# Patient Record
Sex: MALE | Race: BLACK OR AFRICAN AMERICAN | Employment: FULL TIME | URBAN - METROPOLITAN AREA
[De-identification: names, ages, dates, MRNs, and addresses within clinical notes are randomized per-mention and may not be internally consistent; named-entity substitution may affect disease eponyms.]

---

## 2020-01-15 ENCOUNTER — OP HISTORICAL/CONVERTED ENCOUNTER (OUTPATIENT)
Dept: OTHER | Age: 50
End: 2020-01-15

## 2020-02-01 ENCOUNTER — OP HISTORICAL/CONVERTED ENCOUNTER (OUTPATIENT)
Dept: OTHER | Age: 50
End: 2020-02-01

## 2020-03-01 ENCOUNTER — OP HISTORICAL/CONVERTED ENCOUNTER (OUTPATIENT)
Dept: OTHER | Age: 50
End: 2020-03-01

## 2020-04-01 ENCOUNTER — OP HISTORICAL/CONVERTED ENCOUNTER (OUTPATIENT)
Dept: OTHER | Age: 50
End: 2020-04-01

## 2020-05-01 ENCOUNTER — OP HISTORICAL/CONVERTED ENCOUNTER (OUTPATIENT)
Dept: OTHER | Age: 50
End: 2020-05-01

## 2020-06-01 ENCOUNTER — OP HISTORICAL/CONVERTED ENCOUNTER (OUTPATIENT)
Dept: OTHER | Age: 50
End: 2020-06-01

## 2020-07-01 ENCOUNTER — OP HISTORICAL/CONVERTED ENCOUNTER (OUTPATIENT)
Dept: OTHER | Age: 50
End: 2020-07-01

## 2020-07-30 VITALS
BODY MASS INDEX: 29.77 KG/M2 | WEIGHT: 178.7 LBS | DIASTOLIC BLOOD PRESSURE: 92 MMHG | HEIGHT: 65 IN | SYSTOLIC BLOOD PRESSURE: 132 MMHG

## 2020-07-30 PROBLEM — D64.9 ANEMIA: Status: ACTIVE | Noted: 2020-07-30

## 2020-07-30 PROBLEM — D72.819 LEUKOPENIA: Status: ACTIVE | Noted: 2020-07-30

## 2020-07-30 PROBLEM — E05.90 HYPERTHYROIDISM: Status: ACTIVE | Noted: 2020-07-30

## 2020-07-30 RX ORDER — BISMUTH SUBSALICYLATE 262 MG
1 TABLET,CHEWABLE ORAL DAILY
COMMUNITY

## 2020-07-30 RX ORDER — LANOLIN ALCOHOL/MO/W.PET/CERES
CREAM (GRAM) TOPICAL
COMMUNITY
End: 2020-12-11 | Stop reason: ALTCHOICE

## 2020-07-30 RX ORDER — METHIMAZOLE 5 MG/1
TABLET ORAL
COMMUNITY
End: 2021-03-03

## 2020-08-01 ENCOUNTER — OP HISTORICAL/CONVERTED ENCOUNTER (OUTPATIENT)
Dept: OTHER | Age: 50
End: 2020-08-01

## 2020-08-12 VITALS
TEMPERATURE: 98.1 F | OXYGEN SATURATION: 98 % | HEART RATE: 63 BPM | SYSTOLIC BLOOD PRESSURE: 132 MMHG | HEIGHT: 65 IN | BODY MASS INDEX: 29.77 KG/M2 | WEIGHT: 178.7 LBS | DIASTOLIC BLOOD PRESSURE: 92 MMHG

## 2020-08-12 RX ORDER — ZOSTER VACCINE RECOMBINANT, ADJUVANTED 50 MCG/0.5
0.5 KIT INTRAMUSCULAR ONCE
COMMUNITY
End: 2020-12-11 | Stop reason: ALTCHOICE

## 2020-09-01 ENCOUNTER — OP HISTORICAL/CONVERTED ENCOUNTER (OUTPATIENT)
Dept: OTHER | Age: 50
End: 2020-09-01

## 2020-10-01 ENCOUNTER — OP HISTORICAL/CONVERTED ENCOUNTER (OUTPATIENT)
Dept: OTHER | Age: 50
End: 2020-10-01

## 2020-12-04 LAB
BASOPHILS # BLD AUTO: 0 X10E3/UL (ref 0–0.2)
BASOPHILS NFR BLD AUTO: 1 %
EOSINOPHIL # BLD AUTO: 0.1 X10E3/UL (ref 0–0.4)
EOSINOPHIL NFR BLD AUTO: 4 %
ERYTHROCYTE [DISTWIDTH] IN BLOOD BY AUTOMATED COUNT: 13.3 % (ref 11.6–15.4)
HCT VFR BLD AUTO: 40.8 % (ref 37.5–51)
HGB BLD-MCNC: 13.8 G/DL (ref 13–17.7)
IMM GRANULOCYTES # BLD AUTO: 0 X10E3/UL (ref 0–0.1)
IMM GRANULOCYTES NFR BLD AUTO: 0 %
LYMPHOCYTES # BLD AUTO: 1.1 X10E3/UL (ref 0.7–3.1)
LYMPHOCYTES NFR BLD AUTO: 35 %
MCH RBC QN AUTO: 28.9 PG (ref 26.6–33)
MCHC RBC AUTO-ENTMCNC: 33.8 G/DL (ref 31.5–35.7)
MCV RBC AUTO: 85 FL (ref 79–97)
MONOCYTES # BLD AUTO: 0.3 X10E3/UL (ref 0.1–0.9)
MONOCYTES NFR BLD AUTO: 10 %
NEUTROPHILS # BLD AUTO: 1.5 X10E3/UL (ref 1.4–7)
NEUTROPHILS NFR BLD AUTO: 50 %
PLATELET # BLD AUTO: 178 X10E3/UL (ref 150–450)
RBC # BLD AUTO: 4.78 X10E6/UL (ref 4.14–5.8)
T4 FREE SERPL-MCNC: 1.23 NG/DL (ref 0.82–1.77)
TSH SERPL DL<=0.005 MIU/L-ACNC: 1.02 UIU/ML (ref 0.45–4.5)
WBC # BLD AUTO: 3 X10E3/UL (ref 3.4–10.8)

## 2020-12-11 ENCOUNTER — OFFICE VISIT (OUTPATIENT)
Dept: ENDOCRINOLOGY | Age: 50
End: 2020-12-11
Payer: COMMERCIAL

## 2020-12-11 VITALS
BODY MASS INDEX: 29.16 KG/M2 | OXYGEN SATURATION: 97 % | WEIGHT: 175 LBS | SYSTOLIC BLOOD PRESSURE: 136 MMHG | DIASTOLIC BLOOD PRESSURE: 91 MMHG | TEMPERATURE: 96.8 F | HEART RATE: 82 BPM | HEIGHT: 65 IN

## 2020-12-11 DIAGNOSIS — E05.90 HYPERTHYROIDISM: Primary | ICD-10-CM

## 2020-12-11 DIAGNOSIS — D72.819 LEUKOPENIA, UNSPECIFIED TYPE: ICD-10-CM

## 2020-12-11 PROCEDURE — 99213 OFFICE O/P EST LOW 20 MIN: CPT | Performed by: INTERNAL MEDICINE

## 2020-12-11 NOTE — PROGRESS NOTES
History and Physical    Patient: Trish Chow MRN: [id number]  SSN: [id number]    YOB: 1970  Age: 44 y.o. Sex: male      Subjective:      Trish Chow is a 44 y.o. male with no significant past medical history is here for follow-up of hyperthyroidism. He was initially sent to me by hematologist/oncologist Dr. Ricardo Felton. patient does not have a primary care physician. after the initial visit patient had complete thyroid function panel done which continued to show subclinical hyperthyroidism, antibodies came back negative. Following this patient had thyroid uptake scan done which showed increased uptake with uniform distribution. Patient was symptomatic with intermittent palpitations and increased frequency of bowel movements. He was started on methimazole 5 mg daily in 1/2020. After that he reported decrease in frequency of bowel movement and palpitations. So he continues to be on methimazole 5 mg daily. He had labs done prior to this visit. He has lost 3 pounds since last visit. Feels well, sleep is good, no palpitations, tremors, frequent bowel movements. Regarding leukopenia, he is following with hematologist every 3 months. Last appointment was in November 2020. Initial history:  Patient was referred to hematologist for leukopenia. During testing for this it was found that his TSH was very low, so patient is sent here for further evaluation and management. Symptoms: no heat/cold intolerance, weight fluctuates but it has been stable, no palpitations, tremors, sleep is okay, energy level is okay, mood is okay, no hair or skin changes, sometimes constipation and sometimes frequent bowel movements.     Prior history of thyroid problems: no    Recent steroid treatments: no    High dose Biotin supplemnts: Nugenix Multivitamin has 30 µg biotin,Extenze male enhancement, patient takes this on and off, this has micronized DHEA and pregnanolone)    Recent URI: no    Tenderness in neck: no    Swelling in neck: no    Family history of thyroid problems: no    Personal/family history of autoimmune diseases: no    smoking: no    Change in appearance of eyes/ redness/ eye irritation: no    Personal history of cardiac disease: no    Personal history of osteoporosis/fragility fractures: no    Past Medical History:   Diagnosis Date    Anemia 7/30/2020    Anemia     Blood disorder     Hyperthyroidism 7/30/2020    Leukopenia 7/30/2020    Thyroid disease      History reviewed. No pertinent surgical history. Family History   Problem Relation Age of Onset    Hypertension Mother     Cancer Father      Social History     Tobacco Use    Smoking status: Never Smoker    Smokeless tobacco: Never Used   Substance Use Topics    Alcohol use: Never     Frequency: Never      Prior to Admission medications    Medication Sig Start Date End Date Taking? Authorizing Provider   methIMAzole (TAPAZOLE) 5 mg tablet Take  by mouth. Yes Provider, Historical   multivitamin (ONE A DAY) tablet Take 1 Tab by mouth daily. Yes Provider, Historical        Allergies   Allergen Reactions    Penicillins Unknown (comments)       Review of Systems:  ROS    A comprehensive review of systems was preformed and it is negative except mentioned in HPI    Objective:     Vitals:    12/11/20 1000   BP: (!) 136/91   Pulse: 82   Temp: 96.8 °F (36 °C)   TempSrc: Temporal   SpO2: 97%   Weight: 175 lb (79.4 kg)   Height: 5' 5\" (1.651 m)        Physical Exam:    Physical Exam  Vitals signs and nursing note reviewed. Constitutional:       Appearance: Normal appearance. HENT:      Head: Normocephalic and atraumatic. Neck:      Musculoskeletal: Neck supple. Comments: No thyromegaly  Cardiovascular:      Rate and Rhythm: Normal rate and regular rhythm. Pulmonary:      Effort: Pulmonary effort is normal.      Breath sounds: Normal breath sounds. Neurological:      Mental Status: He is alert.           Labs and Imaging:  Results for Anju Carlisle (MRN [id number]) as of 12/11/2020 10:05   Ref. Range 12/3/2020 13:31   WBC Latest Ref Range: 3.4 - 10.8 x10E3/uL 3.0 (L)   RBC Latest Ref Range: 4.14 - 5.80 x10E6/uL 4.78   HGB Latest Ref Range: 13.0 - 17.7 g/dL 13.8   HCT Latest Ref Range: 37.5 - 51.0 % 40.8   MCV Latest Ref Range: 79 - 97 fL 85   MCH Latest Ref Range: 26.6 - 33.0 pg 28.9   MCHC Latest Ref Range: 31.5 - 35.7 g/dL 33.8   RDW Latest Ref Range: 11.6 - 15.4 % 13.3   PLATELET Latest Ref Range: 150 - 450 x10E3/uL 178   NEUTROPHILS Latest Ref Range: Not Estab. % 50   Lymphocytes Latest Ref Range: Not Estab. % 35   MONOCYTES Latest Ref Range: Not Estab. % 10   EOSINOPHILS Latest Ref Range: Not Estab. % 4   BASOPHILS Latest Ref Range: Not Estab. % 1   IMMATURE GRANULOCYTES Latest Ref Range: Not Estab. % 0   ABS. NEUTROPHILS Latest Ref Range: 1.4 - 7.0 x10E3/uL 1.5   ABS. IMM. GRANS. Latest Ref Range: 0.0 - 0.1 x10E3/uL 0.0   Abs Lymphocytes Latest Ref Range: 0.7 - 3.1 x10E3/uL 1.1   ABS. MONOCYTES Latest Ref Range: 0.1 - 0.9 x10E3/uL 0.3   ABS. EOSINOPHILS Latest Ref Range: 0.0 - 0.4 x10E3/uL 0.1   ABS. BASOPHILS Latest Ref Range: 0.0 - 0.2 x10E3/uL 0.0   T4, Free Latest Ref Range: 0.82 - 1.77 ng/dL 1.23   TSH Latest Ref Range: 0.450 - 4.500 uIU/mL 1.020     Last 3 Recorded Weights in this Encounter    12/11/20 1000   Weight: 175 lb (79.4 kg)        No results found for: HBA1C, HGBE8, NGA3UBLY, ZUM7EUEE, NGL5MZYB     Assessment:     Patient Active Problem List   Diagnosis Code    Anemia D64.9    Hyperthyroidism E05.90    Leukopenia D72.819    Blood disorder D75.9           Plan:     hyperthyroidism  0610543:  TSH suppressed at 0.009 (0.45-4.5)  Normal liver enzymes  WBC low at 2.8 on 9570735.    4679062:  TSH undetectable  Free T4 normal at 1.55  Total T3 normal at 154  TRAB, TSI and TPO negative    thyroid uptake scan 7092182:  Mild uniform enlargement of thyroid gland.  24 hour uptake is elevated at 32.4%, uniform distribution. No hypo-or hyperfunctioning nodules. Patient was symptomatic with palpitations and frequent bowel movements. Patient was started on methimazole 5 mg daily. 2-:  TSH suppressed but better at 0.025  Free T4 normal at 1.14  WBC slightly low but similar to baseline at 2.7  Methimazole 5 mg daily was continued    3-:  TSH suppressed but continues to improve at 0.13  Free T4 normal at 1.16  WBC in the normal range at 4.3  Symptomatically better. Methimazole 5 mg daily was continued. 6-1-2020:  TSH normal at 1.65  Free T4 normal at 1.08  Clinically euthyroid    Methimazole 5 mg daily was continued    12-3-2020:  TSH normal at 1.02  Free T4 normal at 1.23  Clinically euthyroid  Plan:  Continue methimazole 5 mg daily. Recheck labs in 6 months, before next visit.    leukopenia  WBC 2.8 in 12/2019. February 2020 WBC 2.7  March 2020 WBC in the normal range at 4.3, ANC was not measured  6-1-2020: WBC borderline low at 3.1, ANC was not measured. 12-3-2020  WBC 3  Absolute neutrophil count 1.5  Patient is closely following with hematologist.  Check CBC with differential before next visit in 6 months.     Orders Placed This Encounter    CBC WITH AUTOMATED DIFF     Standing Status:   Future     Standing Expiration Date:   6/11/2021    TSH AND FREE T4     Standing Status:   Future     Standing Expiration Date:   6/11/2021        Signed By: Naima Clay MD     December 11, 2020      Return to clinic 6 months

## 2021-06-02 LAB
BASOPHILS # BLD AUTO: 0 X10E3/UL (ref 0–0.2)
BASOPHILS NFR BLD AUTO: 1 %
EOSINOPHIL # BLD AUTO: 0.1 X10E3/UL (ref 0–0.4)
EOSINOPHIL NFR BLD AUTO: 2 %
ERYTHROCYTE [DISTWIDTH] IN BLOOD BY AUTOMATED COUNT: 13.5 % (ref 11.6–15.4)
HCT VFR BLD AUTO: 39.4 % (ref 37.5–51)
HGB BLD-MCNC: 13.1 G/DL (ref 13–17.7)
IMM GRANULOCYTES # BLD AUTO: 0 X10E3/UL (ref 0–0.1)
IMM GRANULOCYTES NFR BLD AUTO: 0 %
LYMPHOCYTES # BLD AUTO: 1.1 X10E3/UL (ref 0.7–3.1)
LYMPHOCYTES NFR BLD AUTO: 39 %
MCH RBC QN AUTO: 28.4 PG (ref 26.6–33)
MCHC RBC AUTO-ENTMCNC: 33.2 G/DL (ref 31.5–35.7)
MCV RBC AUTO: 86 FL (ref 79–97)
MONOCYTES # BLD AUTO: 0.3 X10E3/UL (ref 0.1–0.9)
MONOCYTES NFR BLD AUTO: 12 %
NEUTROPHILS # BLD AUTO: 1.3 X10E3/UL (ref 1.4–7)
NEUTROPHILS NFR BLD AUTO: 46 %
PLATELET # BLD AUTO: 166 X10E3/UL (ref 150–450)
RBC # BLD AUTO: 4.61 X10E6/UL (ref 4.14–5.8)
T4 FREE SERPL-MCNC: 1.27 NG/DL (ref 0.82–1.77)
TSH SERPL DL<=0.005 MIU/L-ACNC: 1.93 UIU/ML (ref 0.45–4.5)
WBC # BLD AUTO: 2.8 X10E3/UL (ref 3.4–10.8)

## 2021-06-11 ENCOUNTER — OFFICE VISIT (OUTPATIENT)
Dept: ENDOCRINOLOGY | Age: 51
End: 2021-06-11
Payer: COMMERCIAL

## 2021-06-11 VITALS
HEART RATE: 68 BPM | OXYGEN SATURATION: 99 % | TEMPERATURE: 98.2 F | WEIGHT: 175 LBS | DIASTOLIC BLOOD PRESSURE: 88 MMHG | BODY MASS INDEX: 29.16 KG/M2 | HEIGHT: 65 IN | SYSTOLIC BLOOD PRESSURE: 130 MMHG

## 2021-06-11 DIAGNOSIS — E05.90 HYPERTHYROIDISM: ICD-10-CM

## 2021-06-11 DIAGNOSIS — D72.819 LEUKOPENIA, UNSPECIFIED TYPE: ICD-10-CM

## 2021-06-11 PROCEDURE — 99214 OFFICE O/P EST MOD 30 MIN: CPT | Performed by: INTERNAL MEDICINE

## 2021-06-11 RX ORDER — METHIMAZOLE 5 MG/1
5 TABLET ORAL DAILY
Qty: 90 TABLET | Refills: 1 | Status: SHIPPED | OUTPATIENT
Start: 2021-06-11 | End: 2021-09-09

## 2021-06-11 NOTE — LETTER
6/11/2021 Patient: Theodora Thomas YOB: 1970 Date of Visit: 6/11/2021 Cynthia Diamond MD 
[place] [id number] [place] [address] [address] [employer] Dear Cytnhia Diamond MD, Thank you for referring Mr. Bello Laehy to [address] [place] [address] for evaluation. My notes for this consultation are attached. If you have questions, please do not hesitate to call me. I look forward to following your patient along with you. Sincerely, Suzan Kern MD

## 2021-06-11 NOTE — PROGRESS NOTES
History and Physical    Patient: Constantino Garcia MRN: [id number]  SSN: [id number]    YOB: 1970  Age: 44 y.o. Sex: male      Subjective:      Constantino Garcia is a 44 y.o. male with no significant past medical history is here for follow-up of hyperthyroidism. He was initially sent to me by hematologist/oncologist Dr. Chintan Che. He is in primary care of Dr. Shira Wells.    after the initial visit patient had complete thyroid function panel done which continued to show subclinical hyperthyroidism, antibodies came back negative. Following this patient had thyroid uptake scan done which showed increased uptake with uniform distribution. Patient was symptomatic with intermittent palpitations and increased frequency of bowel movements. He was started on methimazole 5 mg daily in 1/2020. After that he reported decrease in frequency of bowel movement and palpitations. So he continues to be on methimazole 5 mg daily. He had labs done prior to this visit. He has lost 3 pounds since last visit. Feels well, sleep is good, no palpitations, tremors, frequent bowel movements. Regarding leukopenia, he is following with hematologist every 3 months. Last appointment was in March 2021. Initial history:  Patient was referred to hematologist for leukopenia. During testing for this it was found that his TSH was very low, so patient is sent here for further evaluation and management. Symptoms: no heat/cold intolerance, weight fluctuates but it has been stable, no palpitations, tremors, sleep is okay, energy level is okay, mood is okay, no hair or skin changes, sometimes constipation and sometimes frequent bowel movements.     Prior history of thyroid problems: no    Recent steroid treatments: no    High dose Biotin supplemnts: Nugenix Multivitamin has 30 µg biotin,Extenze male enhancement, patient takes this on and off, this has micronized DHEA and pregnanolone)    Recent URI: no    Tenderness in neck: no    Swelling in neck: no    Family history of thyroid problems: no    Personal/family history of autoimmune diseases: no    smoking: no    Change in appearance of eyes/ redness/ eye irritation: no    Personal history of cardiac disease: no    Personal history of osteoporosis/fragility fractures: no    Past Medical History:   Diagnosis Date    Anemia 7/30/2020    Anemia     Blood disorder     Hyperthyroidism 7/30/2020    Leukopenia 7/30/2020    Thyroid disease      History reviewed. No pertinent surgical history. Family History   Problem Relation Age of Onset    Hypertension Mother     Cancer Father      Social History     Tobacco Use    Smoking status: Never Smoker    Smokeless tobacco: Never Used   Substance Use Topics    Alcohol use: Never      Prior to Admission medications    Medication Sig Start Date End Date Taking? Authorizing Provider   methIMAzole (TAPAZOLE) 5 mg tablet Take 1 Tablet by mouth daily for 90 days. 6/11/21 9/9/21 Yes Zully Soliz MD   multivitamin (ONE A DAY) tablet Take 1 Tab by mouth daily. Yes Provider, Historical        Allergies   Allergen Reactions    Penicillins Unknown (comments)       Review of Systems:  ROS    A comprehensive review of systems was preformed and it is negative except mentioned in HPI    Objective:     Vitals:    06/11/21 1011   BP: 130/88   Pulse: 68   Temp: 98.2 °F (36.8 °C)   TempSrc: Temporal   SpO2: 99%   Weight: 175 lb (79.4 kg)   Height: 5' 5\" (1.651 m)        Physical Exam:    Physical Exam  Vitals and nursing note reviewed. Constitutional:       Appearance: Normal appearance. HENT:      Head: Normocephalic and atraumatic. Neck:      Comments: No thyromegaly  Cardiovascular:      Rate and Rhythm: Normal rate and regular rhythm. Pulmonary:      Effort: Pulmonary effort is normal.      Breath sounds: Normal breath sounds. Musculoskeletal:      Cervical back: Neck supple. Neurological:      Mental Status: He is alert. Labs and Imaging:  Results for Young Enrique (MRN [id number]) as of 6/11/2021 10:07   Ref. Range 6/1/2021 12:48   WBC Latest Ref Range: 3.4 - 10.8 x10E3/uL 2.8 (L)   RBC Latest Ref Range: 4.14 - 5.80 x10E6/uL 4.61   HGB Latest Ref Range: 13.0 - 17.7 g/dL 13.1   HCT Latest Ref Range: 37.5 - 51.0 % 39.4   MCV Latest Ref Range: 79 - 97 fL 86   MCH Latest Ref Range: 26.6 - 33.0 pg 28.4   MCHC Latest Ref Range: 31.5 - 35.7 g/dL 33.2   RDW Latest Ref Range: 11.6 - 15.4 % 13.5   PLATELET Latest Ref Range: 150 - 450 x10E3/uL 166   NEUTROPHILS Latest Ref Range: Not Estab. % 46   Lymphocytes Latest Ref Range: Not Estab. % 39   MONOCYTES Latest Ref Range: Not Estab. % 12   EOSINOPHILS Latest Ref Range: Not Estab. % 2   BASOPHILS Latest Ref Range: Not Estab. % 1   IMMATURE GRANULOCYTES Latest Ref Range: Not Estab. % 0   ABS. NEUTROPHILS Latest Ref Range: 1.4 - 7.0 x10E3/uL 1.3 (L)   ABS. IMM. GRANS. Latest Ref Range: 0.0 - 0.1 x10E3/uL 0.0   Abs Lymphocytes Latest Ref Range: 0.7 - 3.1 x10E3/uL 1.1   ABS. MONOCYTES Latest Ref Range: 0.1 - 0.9 x10E3/uL 0.3   ABS. EOSINOPHILS Latest Ref Range: 0.0 - 0.4 x10E3/uL 0.1   ABS. BASOPHILS Latest Ref Range: 0.0 - 0.2 x10E3/uL 0.0   T4, Free Latest Ref Range: 0.82 - 1.77 ng/dL 1.27   TSH Latest Ref Range: 0.450 - 4.500 uIU/mL 1.930       Last 3 Recorded Weights in this Encounter    06/11/21 1011   Weight: 175 lb (79.4 kg)        No results found for: HBA1C, KNT3SMJG, LTU6DBQR, HAN9OFUL     Assessment:     Patient Active Problem List   Diagnosis Code    Anemia D64.9    Hyperthyroidism E05.90    Leukopenia D72.819    Blood disorder D75.9           Plan:     hyperthyroidism  0598936:  TSH suppressed at 0.009 (0.45-4.5)  Normal liver enzymes  WBC low at 2.8 on 9539737.    7423102:  TSH undetectable  Free T4 normal at 1.55  Total T3 normal at 154  TRAB, TSI and TPO negative    thyroid uptake scan 4619120:  Mild uniform enlargement of thyroid gland.  24 hour uptake is elevated at 32.4%, uniform distribution. No hypo-or hyperfunctioning nodules. Patient was symptomatic with palpitations and frequent bowel movements. Patient was started on methimazole 5 mg daily. 2-:  TSH suppressed but better at 0.025  Free T4 normal at 1.14  WBC slightly low but similar to baseline at 2.7  Methimazole 5 mg daily was continued    3-:  TSH suppressed but continues to improve at 0.13  Free T4 normal at 1.16  WBC in the normal range at 4.3  Symptomatically better. Methimazole 5 mg daily was continued. 6-1-2020:  TSH normal at 1.65  Free T4 normal at 1.08  Clinically euthyroid    Methimazole 5 mg daily was continued    12-3-2020:  TSH normal at 1.02  Free T4 normal at 1.23  Clinically euthyroid  Methimazole 5 mg daily was continued    6-1-2021:  TSH normal at 1.27  Free T4 normal at 1.93  Plan:  Continue methimazole 5 mg daily. Recheck labs in 6 months, before next visit.    leukopenia  WBC 2.8 in 12/2019. February 2020 WBC 2.7  March 2020 WBC in the normal range at 4.3, ANC was not measured  6-1-2020: WBC borderline low at 3.1, ANC was not measured. 12-3-2020  WBC 3  Absolute neutrophil count 1.5    6-1-2021:  WBC low at 2.8  Absolute neutrophil count borderline low at 1.3 (1.4-7)  Patient is closely following with hematologist.  I spoke to his hematologist as well and he did not think borderline decrease in absolute neutrophil count is concerning. Check CBC with differential before next visit in 6 months. Orders Placed This Encounter    TSH AND FREE T4     Standing Status:   Future     Standing Expiration Date:   12/11/2021    CBC WITH AUTOMATED DIFF     Standing Status:   Future     Standing Expiration Date:   12/11/2021    methIMAzole (TAPAZOLE) 5 mg tablet     Sig: Take 1 Tablet by mouth daily for 90 days.      Dispense:  90 Tablet     Refill:  1        Signed By: Ryan Gaucher, MD     June 11, 2021      Return to clinic 6 months

## 2021-12-04 LAB
BASOPHILS # BLD AUTO: 0 X10E3/UL (ref 0–0.2)
BASOPHILS NFR BLD AUTO: 1 %
EOSINOPHIL # BLD AUTO: 0.1 X10E3/UL (ref 0–0.4)
EOSINOPHIL NFR BLD AUTO: 5 %
ERYTHROCYTE [DISTWIDTH] IN BLOOD BY AUTOMATED COUNT: 13.4 % (ref 11.6–15.4)
HCT VFR BLD AUTO: 38.3 % (ref 37.5–51)
HGB BLD-MCNC: 13 G/DL (ref 13–17.7)
IMM GRANULOCYTES # BLD AUTO: 0 X10E3/UL (ref 0–0.1)
IMM GRANULOCYTES NFR BLD AUTO: 0 %
LYMPHOCYTES # BLD AUTO: 1.1 X10E3/UL (ref 0.7–3.1)
LYMPHOCYTES NFR BLD AUTO: 39 %
MCH RBC QN AUTO: 28.7 PG (ref 26.6–33)
MCHC RBC AUTO-ENTMCNC: 33.9 G/DL (ref 31.5–35.7)
MCV RBC AUTO: 85 FL (ref 79–97)
MONOCYTES # BLD AUTO: 0.4 X10E3/UL (ref 0.1–0.9)
MONOCYTES NFR BLD AUTO: 13 %
NEUTROPHILS # BLD AUTO: 1.2 X10E3/UL (ref 1.4–7)
NEUTROPHILS NFR BLD AUTO: 42 %
PLATELET # BLD AUTO: 171 X10E3/UL (ref 150–450)
RBC # BLD AUTO: 4.53 X10E6/UL (ref 4.14–5.8)
T4 FREE SERPL-MCNC: 1.2 NG/DL (ref 0.82–1.77)
TSH SERPL DL<=0.005 MIU/L-ACNC: 1.57 UIU/ML (ref 0.45–4.5)
WBC # BLD AUTO: 2.9 X10E3/UL (ref 3.4–10.8)

## 2021-12-11 DIAGNOSIS — E05.90 HYPERTHYROIDISM: ICD-10-CM

## 2021-12-13 ENCOUNTER — OFFICE VISIT (OUTPATIENT)
Dept: ENDOCRINOLOGY | Age: 51
End: 2021-12-13
Payer: COMMERCIAL

## 2021-12-13 VITALS
HEART RATE: 77 BPM | TEMPERATURE: 98.4 F | WEIGHT: 176.4 LBS | BODY MASS INDEX: 29.39 KG/M2 | HEIGHT: 65 IN | DIASTOLIC BLOOD PRESSURE: 93 MMHG | SYSTOLIC BLOOD PRESSURE: 134 MMHG | OXYGEN SATURATION: 98 %

## 2021-12-13 DIAGNOSIS — E05.90 HYPERTHYROIDISM: Primary | ICD-10-CM

## 2021-12-13 PROCEDURE — 99214 OFFICE O/P EST MOD 30 MIN: CPT | Performed by: INTERNAL MEDICINE

## 2021-12-13 RX ORDER — ZOSTER VACCINE RECOMBINANT, ADJUVANTED 50 MCG/0.5
KIT INTRAMUSCULAR
COMMUNITY
End: 2022-05-18 | Stop reason: ALTCHOICE

## 2021-12-13 RX ORDER — METHIMAZOLE 5 MG/1
5 TABLET ORAL EVERY OTHER DAY
Qty: 45 TABLET | Refills: 2 | Status: SHIPPED | OUTPATIENT
Start: 2021-12-13 | End: 2022-03-13

## 2021-12-13 RX ORDER — METHIMAZOLE 5 MG/1
TABLET ORAL
COMMUNITY
Start: 2021-11-06 | End: 2021-12-13 | Stop reason: SDUPTHER

## 2021-12-13 NOTE — PROGRESS NOTES
History and Physical    Patient: Morena Valadez MRN: [id number]  SSN: [id number]    YOB: 1970  Age: 46 y.o. Sex: male      Subjective:      Morena Valadez is a 46 y.o. male with no significant past medical history is here for follow-up of hyperthyroidism. He was initially sent to me by hematologist/oncologist Dr. Velma Munoz. He is in primary care of Dr. Libertad Sandhu.    after the initial visit patient had complete thyroid function panel done which continued to show subclinical hyperthyroidism, antibodies came back negative. Following this patient had thyroid uptake scan done which showed increased uptake with uniform distribution. Patient was symptomatic with intermittent palpitations and increased frequency of bowel movements. He was started on methimazole 5 mg daily in 1/2020. After that he reported decrease in frequency of bowel movement and palpitations. So he continues to be on methimazole 5 mg daily. He had labs done prior to this visit. 1 pound since last visit 6 months back. Feels well, sleep is good, no palpitations, tremors, frequent bowel movements. Regarding leukopenia, he was released from the care of hematologist because everything was staying stable. Initial history:  Patient was referred to hematologist for leukopenia. During testing for this it was found that his TSH was very low, so patient is sent here for further evaluation and management. Symptoms: no heat/cold intolerance, weight fluctuates but it has been stable, no palpitations, tremors, sleep is okay, energy level is okay, mood is okay, no hair or skin changes, sometimes constipation and sometimes frequent bowel movements.     Prior history of thyroid problems: no    Recent steroid treatments: no    High dose Biotin supplemnts: Nugenix Multivitamin has 30 µg biotin,Extenze male enhancement, patient takes this on and off, this has micronized DHEA and pregnanolone)    Recent URI: no    Tenderness in neck: no    Swelling in neck: no    Family history of thyroid problems: no    Personal/family history of autoimmune diseases: no    smoking: no    Change in appearance of eyes/ redness/ eye irritation: no    Personal history of cardiac disease: no    Personal history of osteoporosis/fragility fractures: no    Past Medical History:   Diagnosis Date    Anemia 7/30/2020    Anemia     Blood disorder     Hyperthyroidism 7/30/2020    Leukopenia 7/30/2020    Thyroid disease      History reviewed. No pertinent surgical history. Family History   Problem Relation Age of Onset    Hypertension Mother     Cancer Father      Social History     Tobacco Use    Smoking status: Never Smoker    Smokeless tobacco: Never Used   Substance Use Topics    Alcohol use: Never      Prior to Admission medications    Medication Sig Start Date End Date Taking? Authorizing Provider   varicella-zoster recombinant, PF, (Shingrix, PF,) 50 mcg/0.5 mL susr injection Shingrix (PF) 50 mcg/0.5 mL intramuscular suspension, kit   Yes Provider, Historical   methIMAzole (TAPAZOLE) 5 mg tablet Take 1 Tablet by mouth every other day for 90 days. 12/13/21 3/13/22 Yes Mary Platt MD   multivitamin (ONE A DAY) tablet Take 1 Tab by mouth daily. Yes Provider, Historical        Allergies   Allergen Reactions    Penicillins Unknown (comments)       Review of Systems:  ROS    A comprehensive review of systems was preformed and it is negative except mentioned in HPI    Objective:     Vitals:    12/13/21 1108 12/13/21 1112   BP: (!) 140/96 (!) 134/93   Pulse: 75 77   Temp: 98.4 °F (36.9 °C)    TempSrc: Temporal    SpO2: 98%    Weight: 176 lb 6.4 oz (80 kg)    Height: 5' 5\" (1.651 m)         Physical Exam:    Physical Exam  Vitals and nursing note reviewed. Constitutional:       Appearance: Normal appearance. HENT:      Head: Normocephalic and atraumatic.    Neck:      Comments: No thyromegaly  Cardiovascular:      Rate and Rhythm: Normal rate and regular rhythm. Pulmonary:      Effort: Pulmonary effort is normal.      Breath sounds: Normal breath sounds. Musculoskeletal:      Cervical back: Neck supple. Neurological:      Mental Status: He is alert. Labs and Imaging:  Results for Treva Sheth (MRN [id number]) as of 6/11/2021 10:07   Ref. Range 6/1/2021 12:48   WBC Latest Ref Range: 3.4 - 10.8 x10E3/uL 2.8 (L)   RBC Latest Ref Range: 4.14 - 5.80 x10E6/uL 4.61   HGB Latest Ref Range: 13.0 - 17.7 g/dL 13.1   HCT Latest Ref Range: 37.5 - 51.0 % 39.4   MCV Latest Ref Range: 79 - 97 fL 86   MCH Latest Ref Range: 26.6 - 33.0 pg 28.4   MCHC Latest Ref Range: 31.5 - 35.7 g/dL 33.2   RDW Latest Ref Range: 11.6 - 15.4 % 13.5   PLATELET Latest Ref Range: 150 - 450 x10E3/uL 166   NEUTROPHILS Latest Ref Range: Not Estab. % 46   Lymphocytes Latest Ref Range: Not Estab. % 39   MONOCYTES Latest Ref Range: Not Estab. % 12   EOSINOPHILS Latest Ref Range: Not Estab. % 2   BASOPHILS Latest Ref Range: Not Estab. % 1   IMMATURE GRANULOCYTES Latest Ref Range: Not Estab. % 0   ABS. NEUTROPHILS Latest Ref Range: 1.4 - 7.0 x10E3/uL 1.3 (L)   ABS. IMM. GRANS. Latest Ref Range: 0.0 - 0.1 x10E3/uL 0.0   Abs Lymphocytes Latest Ref Range: 0.7 - 3.1 x10E3/uL 1.1   ABS. MONOCYTES Latest Ref Range: 0.1 - 0.9 x10E3/uL 0.3   ABS. EOSINOPHILS Latest Ref Range: 0.0 - 0.4 x10E3/uL 0.1   ABS.  BASOPHILS Latest Ref Range: 0.0 - 0.2 x10E3/uL 0.0   T4, Free Latest Ref Range: 0.82 - 1.77 ng/dL 1.27   TSH Latest Ref Range: 0.450 - 4.500 uIU/mL 1.930       Last 3 Recorded Weights in this Encounter    12/13/21 1108   Weight: 176 lb 6.4 oz (80 kg)        No results found for: HBA1C, IFR4QJDE, VCI3YBXX, NKD7NSVU     Assessment:     Patient Active Problem List   Diagnosis Code    Anemia D64.9    Hyperthyroidism E05.90    Leukopenia D72.819    Blood disorder D75.9           Plan:     hyperthyroidism  2401860:  TSH suppressed at 0.009 (0.45-4.5)  Normal liver enzymes  WBC low at 2.8 on 9735659.    0944473:  TSH undetectable  Free T4 normal at 1.55  Total T3 normal at 154  TRAB, TSI and TPO negative    thyroid uptake scan 6659927:  Mild uniform enlargement of thyroid gland. 24 hour uptake is elevated at 32.4%, uniform distribution. No hypo-or hyperfunctioning nodules. Patient was symptomatic with palpitations and frequent bowel movements. Patient was started on methimazole 5 mg daily. 2-:  TSH suppressed but better at 0.025  Free T4 normal at 1.14  WBC slightly low but similar to baseline at 2.7  Methimazole 5 mg daily was continued    3-:  TSH suppressed but continues to improve at 0.13  Free T4 normal at 1.16  WBC in the normal range at 4.3  Symptomatically better. Methimazole 5 mg daily was continued. 6-1-2020:  TSH normal at 1.65  Free T4 normal at 1.08  Clinically euthyroid    Methimazole 5 mg daily was continued    12-3-2020:  TSH normal at 1.02  Free T4 normal at 1.23  Clinically euthyroid  Methimazole 5 mg daily was continued    6-1-2021:  TSH normal at 1.27  Free T4 normal at 1.93  Methimazole 5 mg daily was continued    12-3-2021:  TSH normal at 1.57  Free T4 normal at 1.2  Plan:  Decrease methimazole to 5 mg every other day. Recheck labs in 3 months, before next visit.    leukopenia  WBC 2.8 in 12/2019. February 2020 WBC 2.7  March 2020 WBC in the normal range at 4.3, ANC was not measured  6-1-2020: WBC borderline low at 3.1, ANC was not measured. 12-3-2020  WBC 3  Absolute neutrophil count 1.5    6-1-2021:  WBC low at 2.8  Absolute neutrophil count borderline low at 1.3 (1.4-7)  Patient is closely following with hematologist.  I spoke to his hematologist as well and he did not think borderline decrease in absolute neutrophil count is concerning (unless < 1).    12-3-2021:  WBC 2.9  Absolute neutrophil count 1.2  Patient was released from hematologist care. I will continue to monitor CBC every 6 months.     Orders Placed This Encounter    TSH AND FREE T4     Standing Status:   Future     Standing Expiration Date:   6/13/2022    methIMAzole (TAPAZOLE) 5 mg tablet     Sig: Take 1 Tablet by mouth every other day for 90 days.      Dispense:  45 Tablet     Refill:  2        Signed By: Sadi Salazar MD     December 13, 2021      Return to clinic 3 months

## 2021-12-13 NOTE — LETTER
12/13/2021    Patient: Hiwot Flores   YOB: 1970   Date of Visit: 12/13/2021     Trey Rivero MD  [place] [phone]  [address] [facility] [address]    Dear Trey Rivero MD,      Thank you for referring Mr. Rosario Winston to [address] [place] [address] for evaluation. My notes for this consultation are attached. If you have questions, please do not hesitate to call me. I look forward to following your patient along with you.       Sincerely,    Dru Brooks MD

## 2022-03-08 LAB
T4 FREE SERPL-MCNC: 1.12 NG/DL (ref 0.82–1.77)
TSH SERPL DL<=0.005 MIU/L-ACNC: 1.14 UIU/ML (ref 0.45–4.5)

## 2022-03-13 DIAGNOSIS — E05.90 HYPERTHYROIDISM: ICD-10-CM

## 2022-03-15 ENCOUNTER — OFFICE VISIT (OUTPATIENT)
Dept: ENDOCRINOLOGY | Age: 52
End: 2022-03-15
Payer: COMMERCIAL

## 2022-03-15 VITALS
HEIGHT: 65 IN | BODY MASS INDEX: 29.31 KG/M2 | TEMPERATURE: 98 F | RESPIRATION RATE: 18 BRPM | HEART RATE: 69 BPM | SYSTOLIC BLOOD PRESSURE: 139 MMHG | WEIGHT: 175.9 LBS | OXYGEN SATURATION: 98 % | DIASTOLIC BLOOD PRESSURE: 92 MMHG

## 2022-03-15 DIAGNOSIS — I10 BENIGN ESSENTIAL HTN: ICD-10-CM

## 2022-03-15 DIAGNOSIS — E05.90 HYPERTHYROIDISM: Primary | ICD-10-CM

## 2022-03-15 DIAGNOSIS — D72.819 LEUKOPENIA, UNSPECIFIED TYPE: ICD-10-CM

## 2022-03-15 PROCEDURE — 99214 OFFICE O/P EST MOD 30 MIN: CPT | Performed by: INTERNAL MEDICINE

## 2022-03-15 RX ORDER — METHIMAZOLE 5 MG/1
5 TABLET ORAL EVERY OTHER DAY
COMMUNITY
End: 2022-06-17

## 2022-03-15 RX ORDER — AMLODIPINE BESYLATE 5 MG/1
5 TABLET ORAL DAILY
Qty: 30 TABLET | Refills: 3 | Status: SHIPPED | OUTPATIENT
Start: 2022-03-15 | End: 2022-04-14

## 2022-03-15 NOTE — PROGRESS NOTES
History and Physical    Patient: Rodney Chin MRN: [id number]  SSN: [id number]    YOB: 1970  Age: 46 y.o. Sex: male      Subjective:      Rodney Chin is a 46 y.o. male with no significant past medical history is here for follow-up of hyperthyroidism. He was initially sent to me by hematologist/oncologist Dr. Marzena Rahman. He is in primary care of Dr. Hayden Lee.    after the initial visit patient had complete thyroid function panel done which continued to show subclinical hyperthyroidism, antibodies came back negative. Following this patient had thyroid uptake scan done which showed increased uptake with uniform distribution. Patient was symptomatic with intermittent palpitations and increased frequency of bowel movements. He was started on methimazole 5 mg daily in 1/2020. After that he reported decrease in frequency of bowel movement and palpitations. At the last visit in December 2021 we decided to start titrating down methimazole, so he is now taking methimazole 5 mg every other day since past 3 months. No significant change in weight since the last visit, denies heat or cold intolerance, palpitations or tremors, bowel movements are more or less regular. He had labs repeated prior to this visit. Regarding leukopenia, he was released from the care of hematologist because everything was staying stable. Regarding hypertension: He does not have a diagnosis of hypertension but his blood pressure runs high frequently, especially at the bottom number. Initial history:  Patient was referred to hematologist for leukopenia. During testing for this it was found that his TSH was very low, so patient is sent here for further evaluation and management.     Symptoms: no heat/cold intolerance, weight fluctuates but it has been stable, no palpitations, tremors, sleep is okay, energy level is okay, mood is okay, no hair or skin changes, sometimes constipation and sometimes frequent bowel movements. Prior history of thyroid problems: no    Recent steroid treatments: no    High dose Biotin supplemnts: Nugenix Multivitamin has 30 µg biotin,Extenze male enhancement, patient takes this on and off, this has micronized DHEA and pregnanolone)    Recent URI: no    Tenderness in neck: no    Swelling in neck: no    Family history of thyroid problems: no    Personal/family history of autoimmune diseases: no    smoking: no    Change in appearance of eyes/ redness/ eye irritation: no    Personal history of cardiac disease: no    Personal history of osteoporosis/fragility fractures: no    Past Medical History:   Diagnosis Date    Anemia 7/30/2020    Anemia     Blood disorder     Hyperthyroidism 7/30/2020    Leukopenia 7/30/2020    Thyroid disease      History reviewed. No pertinent surgical history. Family History   Problem Relation Age of Onset    Hypertension Mother     Cancer Father      Social History     Tobacco Use    Smoking status: Never Smoker    Smokeless tobacco: Never Used   Substance Use Topics    Alcohol use: Never      Prior to Admission medications    Medication Sig Start Date End Date Taking? Authorizing Provider   methIMAzole (TAPAZOLE) 5 mg tablet Take 5 mg by mouth every other day. Yes Provider, Historical   amLODIPine (NORVASC) 5 mg tablet Take 1 Tablet by mouth daily for 30 days. 3/15/22 4/14/22 Yes Bill Mcdermott MD   multivitamin (ONE A DAY) tablet Take 1 Tab by mouth daily.    Yes Provider, Historical   varicella-zoster recombinant, PF, (Shingrix, PF,) 50 mcg/0.5 mL susr injection Shingrix (PF) 50 mcg/0.5 mL intramuscular suspension, kit  Patient not taking: Reported on 3/15/2022    Provider, Historical        Allergies   Allergen Reactions    Penicillins Unknown (comments)       Review of Systems:  ROS    A comprehensive review of systems was preformed and it is negative except mentioned in HPI    Objective:     Vitals:    03/15/22 0901   BP: (!) 139/92   Pulse: 69 Resp: 18   Temp: 98 °F (36.7 °C)   TempSrc: Oral   SpO2: 98%   Weight: 175 lb 14.4 oz (79.8 kg)   Height: 5' 5\" (1.651 m)        Physical Exam:    Physical Exam  Vitals and nursing note reviewed. Constitutional:       Appearance: Normal appearance. HENT:      Head: Normocephalic and atraumatic. Neck:      Comments: No thyromegaly  Cardiovascular:      Rate and Rhythm: Normal rate and regular rhythm. Pulmonary:      Effort: Pulmonary effort is normal.      Breath sounds: Normal breath sounds. Musculoskeletal:      Cervical back: Neck supple. Neurological:      Mental Status: He is alert. Labs and Imaging:  Results for Kelli Rayo (MRN [id number]) as of 6/11/2021 10:07   Ref. Range 6/1/2021 12:48   WBC Latest Ref Range: 3.4 - 10.8 x10E3/uL 2.8 (L)   RBC Latest Ref Range: 4.14 - 5.80 x10E6/uL 4.61   HGB Latest Ref Range: 13.0 - 17.7 g/dL 13.1   HCT Latest Ref Range: 37.5 - 51.0 % 39.4   MCV Latest Ref Range: 79 - 97 fL 86   MCH Latest Ref Range: 26.6 - 33.0 pg 28.4   MCHC Latest Ref Range: 31.5 - 35.7 g/dL 33.2   RDW Latest Ref Range: 11.6 - 15.4 % 13.5   PLATELET Latest Ref Range: 150 - 450 x10E3/uL 166   NEUTROPHILS Latest Ref Range: Not Estab. % 46   Lymphocytes Latest Ref Range: Not Estab. % 39   MONOCYTES Latest Ref Range: Not Estab. % 12   EOSINOPHILS Latest Ref Range: Not Estab. % 2   BASOPHILS Latest Ref Range: Not Estab. % 1   IMMATURE GRANULOCYTES Latest Ref Range: Not Estab. % 0   ABS. NEUTROPHILS Latest Ref Range: 1.4 - 7.0 x10E3/uL 1.3 (L)   ABS. IMM. GRANS. Latest Ref Range: 0.0 - 0.1 x10E3/uL 0.0   Abs Lymphocytes Latest Ref Range: 0.7 - 3.1 x10E3/uL 1.1   ABS. MONOCYTES Latest Ref Range: 0.1 - 0.9 x10E3/uL 0.3   ABS. EOSINOPHILS Latest Ref Range: 0.0 - 0.4 x10E3/uL 0.1   ABS.  BASOPHILS Latest Ref Range: 0.0 - 0.2 x10E3/uL 0.0   T4, Free Latest Ref Range: 0.82 - 1.77 ng/dL 1.27   TSH Latest Ref Range: 0.450 - 4.500 uIU/mL 1.930       Last 3 Recorded Weights in this Encounter 03/15/22 0901   Weight: 175 lb 14.4 oz (79.8 kg)        No results found for: HBA1C, WFU3VCFW, LWX0PEWU, RXT5ECJX     Assessment:     Patient Active Problem List   Diagnosis Code    Anemia D64.9    Hyperthyroidism E05.90    Leukopenia D72.819    Blood disorder D75.9    Benign essential HTN I10           Plan:     hyperthyroidism  3933429:  TSH suppressed at 0.009 (0.45-4.5)  Normal liver enzymes  WBC low at 2.8 on 9394151.    9658134:  TSH undetectable  Free T4 normal at 1.55  Total T3 normal at 154  TRAB, TSI and TPO negative    thyroid uptake scan 8729790:  Mild uniform enlargement of thyroid gland. 24 hour uptake is elevated at 32.4%, uniform distribution. No hypo-or hyperfunctioning nodules. Patient was symptomatic with palpitations and frequent bowel movements. Patient was started on methimazole 5 mg daily. 2-:  TSH suppressed but better at 0.025  Free T4 normal at 1.14  WBC slightly low but similar to baseline at 2.7  Methimazole 5 mg daily was continued    3-:  TSH suppressed but continues to improve at 0.13  Free T4 normal at 1.16  WBC in the normal range at 4.3  Symptomatically better. Methimazole 5 mg daily was continued. 6-1-2020:  TSH normal at 1.65  Free T4 normal at 1.08  Clinically euthyroid    Methimazole 5 mg daily was continued    12-3-2020:  TSH normal at 1.02  Free T4 normal at 1.23  Clinically euthyroid  Methimazole 5 mg daily was continued    6-1-2021:  TSH normal at 1.27  Free T4 normal at 1.93  Methimazole 5 mg daily was continued    12-3-2021:  TSH normal at 1.57  Free T4 normal at 1.2  Decrease methimazole to 5 mg every other day. 3-7-2022:  TSH normal at 1.14  Free T4 normal at 1.12  Plan:  Stop all methimazole. Recheck labs in 3 months, before next visit.    leukopenia  WBC 2.8 in 12/2019.    February 2020 WBC 2.7  March 2020 WBC in the normal range at 4.3, ANC was not measured  6-1-2020: WBC borderline low at 3.1, ANC was not measured. 12-3-2020  WBC 3  Absolute neutrophil count 1.5    6-1-2021:  WBC low at 2.8  Absolute neutrophil count borderline low at 1.3 (1.4-7)  Patient is closely following with hematologist.  I spoke to his hematologist as well and he did not think borderline decrease in absolute neutrophil count is concerning (unless < 1).    12-3-2021:  WBC 2.9  Absolute neutrophil count 1.2  Patient was released from hematologist care. I will continue to monitor CBC every 6 months. Check CBC prior to next visit. Essential hypertension  Blood pressure stays slightly elevated, especially diastolic blood pressure. Start amlodipine 5 mg daily. Follow-up with PCP. Orders Placed This Encounter    TSH AND FREE T4     Standing Status:   Future     Standing Expiration Date:   9/15/2022    CBC WITH AUTOMATED DIFF     Standing Status:   Future     Standing Expiration Date:   9/15/2022    amLODIPine (NORVASC) 5 mg tablet     Sig: Take 1 Tablet by mouth daily for 30 days.      Dispense:  30 Tablet     Refill:  3     DC methimazole        Signed By: Arnie Mathias MD     March 15, 2022      Return to clinic 3 months

## 2022-03-15 NOTE — PROGRESS NOTES
1. \"Have you been to the ER, urgent care clinic since your last visit? Hospitalized since your last visit? \" No    2. \"Have you seen or consulted any other health care providers outside of the [address] [place] [address] since your last visit? \" No     3. For patients aged 39-70: Has the patient had a colonoscopy / FIT/ Cologuard? Yes - no Care Gap present      If the patient is female:    4. For patients aged 41-77: Has the patient had a mammogram within the past 2 years? No      5. For patients aged 21-65: Has the patient had a pap smear?  No

## 2022-03-15 NOTE — LETTER
3/15/2022    Patient: Mary Leigh   YOB: 1970   Date of Visit: 3/15/2022     Nicole Thakur MD  [place] [id number]  [facility] [address]    Dear Nicole Thakur MD,      Thank you for referring Mr. Bereket Mistry to [address] [place] [address] for evaluation. My notes for this consultation are attached. If you have questions, please do not hesitate to call me. I look forward to following your patient along with you.       Sincerely,    Lyssa Pearson MD

## 2022-03-19 PROBLEM — D72.819 LEUKOPENIA: Status: ACTIVE | Noted: 2020-07-30

## 2022-03-19 PROBLEM — E05.90 HYPERTHYROIDISM: Status: ACTIVE | Noted: 2020-07-30

## 2022-03-19 PROBLEM — D64.9 ANEMIA: Status: ACTIVE | Noted: 2020-07-30

## 2022-03-24 PROBLEM — I10 BENIGN ESSENTIAL HTN: Status: ACTIVE | Noted: 2022-03-15

## 2022-05-18 ENCOUNTER — OFFICE VISIT (OUTPATIENT)
Dept: PRIMARY CARE CLINIC | Age: 52
End: 2022-05-18
Payer: COMMERCIAL

## 2022-05-18 VITALS
RESPIRATION RATE: 20 BRPM | SYSTOLIC BLOOD PRESSURE: 128 MMHG | HEART RATE: 74 BPM | BODY MASS INDEX: 30.02 KG/M2 | HEIGHT: 65 IN | TEMPERATURE: 97.4 F | WEIGHT: 180.2 LBS | OXYGEN SATURATION: 98 % | DIASTOLIC BLOOD PRESSURE: 81 MMHG

## 2022-05-18 DIAGNOSIS — I10 ESSENTIAL HYPERTENSION: Primary | ICD-10-CM

## 2022-05-18 DIAGNOSIS — E05.90 HYPERTHYROIDISM: ICD-10-CM

## 2022-05-18 DIAGNOSIS — D72.819 LEUKOPENIA, UNSPECIFIED TYPE: ICD-10-CM

## 2022-05-18 PROCEDURE — 99203 OFFICE O/P NEW LOW 30 MIN: CPT | Performed by: FAMILY MEDICINE

## 2022-05-18 RX ORDER — AMLODIPINE BESYLATE 5 MG/1
5 TABLET ORAL DAILY
COMMUNITY
Start: 2022-05-09 | End: 2022-06-17 | Stop reason: SDUPTHER

## 2022-05-18 NOTE — PROGRESS NOTES
HPI     Chief Complaint:   Chief Complaint   Patient presents with   [address] [place] [address] Avenue is an 46 y.o. male who presents to establish care. Patient was previously receiving care at [facility].  Medical history significant for:   Patient Active Problem List   Diagnosis Code    Anemia D64.9    Hyperthyroidism E05.90    Leukopenia D72.819    Blood disorder D75.9    Essential hypertension I10       Concerns for today:     Leukopenia: followed by Dr. Hiram Resendez at [facility]. Patient states etiology was unknown but he was on iron for sometime, which was discontinued. He reports since his labs were stable he was released from their care. Hyperthyroidism: Followed by Dr. Florencio Sutton and is coming off methimazole per last visit March 2022 with re-evaluation over the summer. He denies palpitations, chest pain, dyspnea. HTN: on amlodipine 5mg and tolerates well without RUTHY. Current medications include:   Current Outpatient Medications   Medication Sig    amLODIPine (NORVASC) 5 mg tablet Take 5 mg by mouth daily.  multivitamin (ONE A DAY) tablet Take 1 Tab by mouth daily.  methIMAzole (TAPAZOLE) 5 mg tablet Take 5 mg by mouth every other day. (Patient not taking: Reported on 5/18/2022)     No current facility-administered medications for this visit. Allergies - reviewed:    Allergies   Allergen Reactions    Penicillins Unknown (comments)         Past Medical History - reviewed:  Past Medical History:   Diagnosis Date    Anemia 7/30/2020    Anemia     Blood disorder     Hyperthyroidism 7/30/2020    Leukopenia 7/30/2020    Thyroid disease        Social History - reviewed:  Social History     Tobacco Use    Smoking status: Never Smoker    Smokeless tobacco: Never Used   Vaping Use    Vaping Use: Never used   Substance Use Topics    Alcohol use: Never    Drug use: Never       Past Surgical History - reviewed:  Past Surgical History:   Procedure Laterality Date    HX COLONOSCOPY         Family History - reviewed:  Family History   Problem Relation Age of Onset    Hypertension Mother     Cancer Father          Immunizations - reviewed:   Immunization History   Administered Date(s) Administered    COVID-19, Pfizer Purple top, DILUTE for use, 12+ yrs, 30mcg/0.3mL dose 03/31/2021, 04/21/2021, 10/21/2021       Review of Systems   Review of Systems   Constitutional: Negative for chills and fever. Respiratory: Negative for cough and shortness of breath. Cardiovascular: Negative for chest pain and palpitations. Endo/Heme/Allergies: Negative for polydipsia. Does not bruise/bleed easily. Psychiatric/Behavioral: Negative for hallucinations and substance abuse. Objective     Visit Vitals  /81 (BP 1 Location: Right upper arm, BP Patient Position: Sitting, BP Cuff Size: Large adult)   Pulse 74   Temp 97.4 °F (36.3 °C) (Temporal)   Resp 20   Ht 5' 5\" (1.651 m)   Wt 180 lb 3.2 oz (81.7 kg)   SpO2 98%   BMI 29.99 kg/m²       Physical Exam  Vitals and nursing note reviewed. Constitutional:       General: He is not in acute distress. HENT:      Head: Normocephalic and atraumatic. Eyes:      Extraocular Movements: Extraocular movements intact. Conjunctiva/sclera: Conjunctivae normal.   Cardiovascular:      Rate and Rhythm: Normal rate and regular rhythm. Pulmonary:      Effort: Pulmonary effort is normal. No respiratory distress. Breath sounds: Normal breath sounds. Musculoskeletal:      Cervical back: Normal range of motion and neck supple. Skin:     General: Skin is warm. Coloration: Skin is not jaundiced. Neurological:      Mental Status: He is alert. Psychiatric:         Mood and Affect: Mood normal.         Behavior: Behavior normal.             Assessment and Plan     Diagnoses and all orders for this visit:    1. Essential hypertension  Comments:  Stable on amlodipine 5mg, continue.  DASH diet and encouraged 30 minutes of daily moderate intensity exercise most days of the week. 2. Hyperthyroidism  Comments:  Managed by Dr. Bianca Lackey, Endocrinology. 3. Leukopenia, unspecified type  Comments:  Requesting H/O previous notes/labs. Patient reports he has been cleared by them. Follow-up and Dispositions    Return in about 6 months (around 11/18/2022) for annual physical.         I discussed the aforementioned diagnoses with the patient as well as the plan of care. All questions were answered and an AVS was provided.      Roberta Whitaker MD  [facility]  [facility], [place], [address] [place] [address]

## 2022-05-18 NOTE — PROGRESS NOTES
1. \"Have you been to the ER, urgent care clinic since your last visit? Hospitalized since your last visit? \" No    2. \"Have you seen or consulted any other health care providers outside of the [address] [place] [address] since your last visit? \" No     3. For patients aged 39-70: Has the patient had a colonoscopy / FIT/ Cologuard? Yes - no Care Gap present 2019      If the patient is female:    4. For patients aged 41-77: Has the patient had a mammogram within the past 2 years? NA - based on age or sex      11. For patients aged 21-65: Has the patient had a pap smear?  NA - based on age or sex  Visit Vitals  /81 (BP 1 Location: Right upper arm, BP Patient Position: Sitting, BP Cuff Size: Large adult)   Pulse 74   Temp 97.4 °F (36.3 °C) (Temporal)   Resp 20   Ht 5' 5\" (1.651 m)   Wt 180 lb 3.2 oz (81.7 kg)   SpO2 98%   BMI 29.99 kg/m²     Chief Complaint   Patient presents with   [address]

## 2022-06-11 LAB
BASOPHILS # BLD AUTO: 0 X10E3/UL (ref 0–0.2)
BASOPHILS NFR BLD AUTO: 1 %
EOSINOPHIL # BLD AUTO: 0.2 X10E3/UL (ref 0–0.4)
EOSINOPHIL NFR BLD AUTO: 5 %
ERYTHROCYTE [DISTWIDTH] IN BLOOD BY AUTOMATED COUNT: 13.7 % (ref 11.6–15.4)
HCT VFR BLD AUTO: 39.7 % (ref 37.5–51)
HGB BLD-MCNC: 13.4 G/DL (ref 13–17.7)
IMM GRANULOCYTES # BLD AUTO: 0 X10E3/UL (ref 0–0.1)
IMM GRANULOCYTES NFR BLD AUTO: 0 %
LYMPHOCYTES # BLD AUTO: 1 X10E3/UL (ref 0.7–3.1)
LYMPHOCYTES NFR BLD AUTO: 33 %
MCH RBC QN AUTO: 28.2 PG (ref 26.6–33)
MCHC RBC AUTO-ENTMCNC: 33.8 G/DL (ref 31.5–35.7)
MCV RBC AUTO: 84 FL (ref 79–97)
MONOCYTES # BLD AUTO: 0.5 X10E3/UL (ref 0.1–0.9)
MONOCYTES NFR BLD AUTO: 15 %
NEUTROPHILS # BLD AUTO: 1.5 X10E3/UL (ref 1.4–7)
NEUTROPHILS NFR BLD AUTO: 46 %
PLATELET # BLD AUTO: 173 X10E3/UL (ref 150–450)
RBC # BLD AUTO: 4.75 X10E6/UL (ref 4.14–5.8)
T4 FREE SERPL-MCNC: 1.23 NG/DL (ref 0.82–1.77)
TSH SERPL DL<=0.005 MIU/L-ACNC: 1.06 UIU/ML (ref 0.45–4.5)
WBC # BLD AUTO: 3.1 X10E3/UL (ref 3.4–10.8)

## 2022-06-15 DIAGNOSIS — D72.819 LEUKOPENIA, UNSPECIFIED TYPE: ICD-10-CM

## 2022-06-15 DIAGNOSIS — E05.90 HYPERTHYROIDISM: ICD-10-CM

## 2022-06-17 ENCOUNTER — OFFICE VISIT (OUTPATIENT)
Dept: ENDOCRINOLOGY | Age: 52
End: 2022-06-17
Payer: COMMERCIAL

## 2022-06-17 VITALS
WEIGHT: 178 LBS | HEIGHT: 65 IN | HEART RATE: 62 BPM | OXYGEN SATURATION: 98 % | DIASTOLIC BLOOD PRESSURE: 79 MMHG | SYSTOLIC BLOOD PRESSURE: 116 MMHG | BODY MASS INDEX: 29.66 KG/M2 | TEMPERATURE: 97.8 F

## 2022-06-17 DIAGNOSIS — D72.819 LEUKOPENIA, UNSPECIFIED TYPE: ICD-10-CM

## 2022-06-17 DIAGNOSIS — E05.90 HYPERTHYROIDISM: Primary | ICD-10-CM

## 2022-06-17 DIAGNOSIS — I10 BENIGN ESSENTIAL HTN: ICD-10-CM

## 2022-06-17 PROCEDURE — 99213 OFFICE O/P EST LOW 20 MIN: CPT | Performed by: INTERNAL MEDICINE

## 2022-06-17 RX ORDER — AMLODIPINE BESYLATE 5 MG/1
5 TABLET ORAL DAILY
Qty: 30 TABLET | Refills: 3 | Status: SHIPPED | OUTPATIENT
Start: 2022-06-17 | End: 2022-09-15

## 2022-06-17 NOTE — PROGRESS NOTES
History and Physical    Patient: Vernell Maurer MRN: [id number]  SSN: [id number]    YOB: 1970  Age: 45 y.o. Sex: male      Subjective:      Vernell Maurer is a 45 y.o. male with no significant past medical history is here for follow-up of hyperthyroidism. He was initially sent to me by hematologist/oncologist Dr. Kimmie Newberry. He is in primary care of Dr. Mechelle Cordoba.    after the initial visit patient had complete thyroid function panel done which continued to show subclinical hyperthyroidism, antibodies came back negative. Following this patient had thyroid uptake scan done which showed increased uptake with uniform distribution. Patient was symptomatic with intermittent palpitations and increased frequency of bowel movements. He was started on methimazole 5 mg daily in 1/2020. After that he reported decrease in frequency of bowel movement and palpitations. At the last visit in December 2021 we decided to start titrating down methimazole, at the last visit in March she was taking 5 mg every other day which we completely stopped. Since the last visit he has gained 3 pounds. He is feeling well, denies palpitations, tremors, sleep is okay, bowel movements are regular, denies heat or cold intolerance. He had labs repeated prior to this visit. Regarding leukopenia, he was released from the care of hematologist because everything was staying stable. Regarding hypertension: At the last visit he was started on amlodipine 5 mg daily. He is taking it regularly and tolerating it well. He is noticing that his blood pressure is much better now. Initial history:  Patient was referred to hematologist for leukopenia. During testing for this it was found that his TSH was very low, so patient is sent here for further evaluation and management.     Symptoms: no heat/cold intolerance, weight fluctuates but it has been stable, no palpitations, tremors, sleep is okay, energy level is okay, mood is okay, no hair or skin changes, sometimes constipation and sometimes frequent bowel movements. Prior history of thyroid problems: no    Recent steroid treatments: no    High dose Biotin supplemnts: Nugenix Multivitamin has 30 µg biotin,Extenze male enhancement, patient takes this on and off, this has micronized DHEA and pregnanolone)    Recent URI: no    Tenderness in neck: no    Swelling in neck: no    Family history of thyroid problems: no    Personal/family history of autoimmune diseases: no    smoking: no    Change in appearance of eyes/ redness/ eye irritation: no    Personal history of cardiac disease: no    Personal history of osteoporosis/fragility fractures: no    Past Medical History:   Diagnosis Date    Anemia 7/30/2020    Anemia     Blood disorder     Hyperthyroidism 7/30/2020    Leukopenia 7/30/2020    Thyroid disease      Past Surgical History:   Procedure Laterality Date    HX COLONOSCOPY        Family History   Problem Relation Age of Onset    Hypertension Mother     Cancer Father      Social History     Tobacco Use    Smoking status: Never Smoker    Smokeless tobacco: Never Used   Substance Use Topics    Alcohol use: Never      Prior to Admission medications    Medication Sig Start Date End Date Taking? Authorizing Provider   amLODIPine (NORVASC) 5 mg tablet Take 1 Tablet by mouth daily for 90 days. 6/17/22 9/15/22 Yes Roselyn Jaime MD   multivitamin (ONE A DAY) tablet Take 1 Tab by mouth daily. Yes Provider, Historical        Allergies   Allergen Reactions    Penicillins Unknown (comments)       Review of Systems:  ROS    A comprehensive review of systems was preformed and it is negative except mentioned in HPI    Objective:     Vitals:    06/17/22 0855   BP: 116/79   Pulse: 62   Temp: 97.8 °F (36.6 °C)   TempSrc: Temporal   SpO2: 98%   Weight: 178 lb (80.7 kg)   Height: 5' 5\" (1.651 m)        Physical Exam:    Physical Exam  Vitals and nursing note reviewed. Constitutional:       Appearance: Normal appearance. HENT:      Head: Normocephalic and atraumatic. Neck:      Comments: No thyromegaly  Cardiovascular:      Rate and Rhythm: Normal rate and regular rhythm. Pulmonary:      Effort: Pulmonary effort is normal.      Breath sounds: Normal breath sounds. Neurological:      Mental Status: He is alert. Labs and Imaging:  Results for Robert Mccord (MRN [id number]) as of 6/11/2021 10:07   Ref. Range 6/1/2021 12:48   WBC Latest Ref Range: 3.4 - 10.8 x10E3/uL 2.8 (L)   RBC Latest Ref Range: 4.14 - 5.80 x10E6/uL 4.61   HGB Latest Ref Range: 13.0 - 17.7 g/dL 13.1   HCT Latest Ref Range: 37.5 - 51.0 % 39.4   MCV Latest Ref Range: 79 - 97 fL 86   MCH Latest Ref Range: 26.6 - 33.0 pg 28.4   MCHC Latest Ref Range: 31.5 - 35.7 g/dL 33.2   RDW Latest Ref Range: 11.6 - 15.4 % 13.5   PLATELET Latest Ref Range: 150 - 450 x10E3/uL 166   NEUTROPHILS Latest Ref Range: Not Estab. % 46   Lymphocytes Latest Ref Range: Not Estab. % 39   MONOCYTES Latest Ref Range: Not Estab. % 12   EOSINOPHILS Latest Ref Range: Not Estab. % 2   BASOPHILS Latest Ref Range: Not Estab. % 1   IMMATURE GRANULOCYTES Latest Ref Range: Not Estab. % 0   ABS. NEUTROPHILS Latest Ref Range: 1.4 - 7.0 x10E3/uL 1.3 (L)   ABS. IMM. GRANS. Latest Ref Range: 0.0 - 0.1 x10E3/uL 0.0   Abs Lymphocytes Latest Ref Range: 0.7 - 3.1 x10E3/uL 1.1   ABS. MONOCYTES Latest Ref Range: 0.1 - 0.9 x10E3/uL 0.3   ABS. EOSINOPHILS Latest Ref Range: 0.0 - 0.4 x10E3/uL 0.1   ABS.  BASOPHILS Latest Ref Range: 0.0 - 0.2 x10E3/uL 0.0   T4, Free Latest Ref Range: 0.82 - 1.77 ng/dL 1.27   TSH Latest Ref Range: 0.450 - 4.500 uIU/mL 1.930       Last 3 Recorded Weights in this Encounter    06/17/22 0855   Weight: 178 lb (80.7 kg)        No results found for: HBA1C, SNX7WBMN, MTU1SLOS, XRU7EGFU     Assessment:     Patient Active Problem List   Diagnosis Code    Anemia D64.9    Hyperthyroidism E05.90    Leukopenia D72.819    Blood disorder D75.9    Essential hypertension I10           Plan:     hyperthyroidism  12-4-2019:  TSH suppressed at 0.009 (0.45-4.5)  Normal liver enzymes  WBC low at 2.8 on 12-4-2019.    12-9-2019:  TSH undetectable  Free T4 normal at 1.55  Total T3 normal at 154  TRAB, TSI and TPO negative    thyroid uptake scan 1-:  Mild uniform enlargement of thyroid gland. 24 hour uptake is elevated at 32.4%, uniform distribution. No hypo-or hyperfunctioning nodules. Patient was symptomatic with palpitations and frequent bowel movements. Patient was started on methimazole 5 mg daily. 2-:  TSH suppressed but better at 0.025  Free T4 normal at 1.14  WBC slightly low but similar to baseline at 2.7  Methimazole 5 mg daily was continued    3-:  TSH suppressed but continues to improve at 0.13  Free T4 normal at 1.16  WBC in the normal range at 4.3  Symptomatically better. Methimazole 5 mg daily was continued. 6-1-2020:  TSH normal at 1.65  Free T4 normal at 1.08  Clinically euthyroid    Methimazole 5 mg daily was continued    12-3-2020:  TSH normal at 1.02  Free T4 normal at 1.23  Clinically euthyroid  Methimazole 5 mg daily was continued    6-1-2021:  TSH normal at 1.27  Free T4 normal at 1.93  Methimazole 5 mg daily was continued    12-3-2021:  TSH normal at 1.57  Free T4 normal at 1.2  Decrease methimazole to 5 mg every other day. 3-7-2022:  TSH normal at 1.14  Free T4 normal at 1.12  Stop all methimazole. 6-:  TSH normal at 1.06  Free T4 normal at 1.23  Plan:  Continue without methimazole and follow-up with PCP for repeat TSH in 3 months. leukopenia  WBC 2.8 in 12/2019. February 2020 WBC 2.7  March 2020 WBC in the normal range at 4.3, ANC was not measured  6-1-2020: WBC borderline low at 3.1, ANC was not measured.     12-3-2020  WBC 3  Absolute neutrophil count 1.5    6-1-2021:  WBC low at 2.8  Absolute neutrophil count borderline low at 1.3 (1.4-7)  Patient is closely following with hematologist.  I spoke to his hematologist as well and he did not think borderline decrease in absolute neutrophil count is concerning (unless < 1).    12-3-2021:  WBC 2.9  Absolute neutrophil count 1.2  Patient was released from hematologist care. I will continue to monitor CBC every 6 months. 6-:  WBC low but better at 3.1  Absolute neutrophil count normal at 1.5  Plan:  Follow-up with PCP for continued monitoring every 6 months. Essential hypertension  Blood pressure is well controlled with amlodipine 5 mg daily. Continue the same. Orders Placed This Encounter    amLODIPine (NORVASC) 5 mg tablet     Sig: Take 1 Tablet by mouth daily for 90 days.      Dispense:  30 Tablet     Refill:  3        Signed By: Remy Jimenez MD     June 17, 2022      Return to clinic

## 2022-06-17 NOTE — LETTER
6/17/2022    Patient: Radha Chavez   YOB: 1970   Date of Visit: 6/17/2022     Bertram Hackett MD  [place] [id number]  [address] [place]    Dear Bertram Hackett MD,      Thank you for referring Mr. Nabil Ibrahim to [address] [place] [address] for evaluation. My notes for this consultation are attached. If you have questions, please do not hesitate to call me. I look forward to following your patient along with you.       Sincerely,    Juan Alejandre MD

## 2022-11-18 ENCOUNTER — OFFICE VISIT (OUTPATIENT)
Dept: PRIMARY CARE CLINIC | Age: 52
End: 2022-11-18
Payer: COMMERCIAL

## 2022-11-18 VITALS
TEMPERATURE: 97.2 F | DIASTOLIC BLOOD PRESSURE: 87 MMHG | HEART RATE: 82 BPM | BODY MASS INDEX: 31.52 KG/M2 | HEIGHT: 65 IN | OXYGEN SATURATION: 98 % | SYSTOLIC BLOOD PRESSURE: 131 MMHG | RESPIRATION RATE: 18 BRPM | WEIGHT: 189.2 LBS

## 2022-11-18 DIAGNOSIS — E05.90 HYPERTHYROIDISM: ICD-10-CM

## 2022-11-18 DIAGNOSIS — I10 ESSENTIAL HYPERTENSION: ICD-10-CM

## 2022-11-18 DIAGNOSIS — Z13.6 ENCOUNTER FOR SCREENING FOR CARDIOVASCULAR DISORDERS: ICD-10-CM

## 2022-11-18 DIAGNOSIS — Z00.00 WELL ADULT EXAM: Primary | ICD-10-CM

## 2022-11-18 DIAGNOSIS — Z11.59 NEED FOR HEPATITIS C SCREENING TEST: ICD-10-CM

## 2022-11-18 DIAGNOSIS — D72.819 LEUKOPENIA, UNSPECIFIED TYPE: ICD-10-CM

## 2022-11-18 PROCEDURE — 3078F DIAST BP <80 MM HG: CPT | Performed by: FAMILY MEDICINE

## 2022-11-18 PROCEDURE — 3074F SYST BP LT 130 MM HG: CPT | Performed by: FAMILY MEDICINE

## 2022-11-18 PROCEDURE — 99396 PREV VISIT EST AGE 40-64: CPT | Performed by: FAMILY MEDICINE

## 2022-11-18 RX ORDER — AMLODIPINE BESYLATE 5 MG/1
5 TABLET ORAL DAILY
Qty: 90 TABLET | Refills: 1 | Status: SHIPPED | OUTPATIENT
Start: 2022-11-18

## 2022-11-18 RX ORDER — AMLODIPINE BESYLATE 5 MG/1
5 TABLET ORAL DAILY
COMMUNITY
Start: 2022-10-11 | End: 2022-11-18 | Stop reason: SDUPTHER

## 2022-11-18 NOTE — PROGRESS NOTES
HPI     Chief Complaint   Patient presents with    Physical       Latonia Villalobos is a 45 y.o. male presenting for well male exam and is also due for follow up care of chronic issues. Latonia Villalobos is willing to do both appointments today and realizes that there may be a co-pay for the follow-up portion of the visit. Specialist: was seeing Dr. Jeyson Zhang for hyperthyroidism  Occupation: works for [employer]: overdue, plans to see in January 2023    Health Maintenance reviewed -  Colonoscopy - UTD, will get reports sent here. Cannot remember GI provider's name  HCV screening - due  COVID vaccines - due for booster  Pneumoccocal vaccines - not indicated at this time  Influenza - refused  PSA - discussed. Screen at appropriate interval, no additional risk factors. Chronic Problems    Hypothyroidism: Patient was following with Dr. Jeyson Zhang, who has since left her practice. Patient had labs that indicated subclinical hypothyroidism and a thyroid uptake scan which showed uniform distribution. Patient was symptomatic with palpitations and increased bowel movement so he was started on methimazole 5 mg daily back in January 2020. His symptoms were improving so they titrated him down to methimazole 5 mg every other day before eventually stopping the methimazole. He has continued to be asymptomatic. Leukopenia: Was evaluated by hematology and eventually released from their care as labs remained stable. Allergies- reviewed  Allergies   Allergen Reactions    Penicillins Unknown (comments)       Medications- reviewed  Current Outpatient Medications   Medication Sig    amLODIPine (NORVASC) 5 mg tablet Take 1 Tablet by mouth daily.  diph,Pertuss,Acell,,Tet Vac-PF (ADACEL) 2 Lf-(2.5-5-3-5 mcg)-5Lf/0.5 mL susp 0.5 mL by IntraMUSCular route once for 1 dose.  multivitamin (ONE A DAY) tablet Take 1 Tab by mouth daily. No current facility-administered medications for this visit. Immunizations - reviewed:   Immunization History   Administered Date(s) Administered    COVID-19, PFIZER PURPLE top, DILUTE for use, (age 15 y+), IM, 30mcg/0.3mL 03/31/2021, 04/21/2021, 10/21/2021     Flu: recommended every fall. Review of Systems   Review of Systems   Constitutional:  Negative for chills and fever. Eyes:  Negative for discharge and redness. Respiratory:  Negative for cough and shortness of breath. Cardiovascular:  Negative for chest pain and palpitations. Gastrointestinal:  Negative for abdominal pain and blood in stool. Musculoskeletal:  Negative for falls and myalgias. Skin:  Negative for itching and rash. Neurological:  Negative for focal weakness and headaches. Endo/Heme/Allergies:  Negative for polydipsia. Does not bruise/bleed easily. Psychiatric/Behavioral:  Negative for hallucinations and substance abuse. Reviewed PmHx, FmHx, SocHx as well as meds and allergies, updated and dated in the chart. Social History     Tobacco Use    Smoking status: Never    Smokeless tobacco: Never   Vaping Use    Vaping Use: Never used   Substance Use Topics    Alcohol use: Never    Drug use: Never     Past Medical History:   Diagnosis Date    Anemia 7/30/2020    Anemia     Blood disorder     Hyperthyroidism 7/30/2020    Leukopenia 7/30/2020    Thyroid disease      Family History   Problem Relation Age of Onset    Hypertension Mother     Cancer Father           Objective     Physical Exam  Visit Vitals  /87 (BP 1 Location: Right upper arm, BP Patient Position: Sitting, BP Cuff Size: Adult)   Pulse 82   Temp 97.2 °F (36.2 °C) (Temporal)   Resp 18   Ht 5' 5\" (1.651 m)   Wt 189 lb 3.2 oz (85.8 kg)   SpO2 98%   BMI 31.48 kg/m²       Physical Exam  Vitals and nursing note reviewed. Constitutional:       General: He is not in acute distress. Appearance: Normal appearance. HENT:      Head: Normocephalic and atraumatic.       Right Ear: Tympanic membrane and external ear normal.      Left Ear: Tympanic membrane and external ear normal.      Nose: Nose normal.      Mouth/Throat:      Mouth: Mucous membranes are moist.      Pharynx: No oropharyngeal exudate. Eyes:      Extraocular Movements: Extraocular movements intact. Conjunctiva/sclera: Conjunctivae normal.      Pupils: Pupils are equal, round, and reactive to light. Cardiovascular:      Rate and Rhythm: Normal rate and regular rhythm. Heart sounds: No murmur heard. Pulmonary:      Effort: Pulmonary effort is normal. No respiratory distress. Breath sounds: Normal breath sounds. Abdominal:      General: Bowel sounds are normal. There is no distension. Musculoskeletal:         General: Normal range of motion. Cervical back: Normal range of motion and neck supple. Right lower leg: No edema. Left lower leg: No edema. Skin:     General: Skin is warm. Coloration: Skin is not jaundiced. Neurological:      General: No focal deficit present. Mental Status: He is alert and oriented to person, place, and time. Psychiatric:         Mood and Affect: Mood normal.         Behavior: Behavior normal.             Assessment and Plan     Diagnoses and all orders for this visit:    1. Well adult exam  Comments:  Age-appropriate guidance given. Health maintenance updated. Patient refuses influenza vaccine. Orders:  -     CBC W/O DIFF  -     METABOLIC PANEL, COMPREHENSIVE  -     LIPID PANEL  -     HEPATITIS C AB, RFLX TO QT BY PCR  -     HEMOGLOBIN A1C WITH EAG  -     TSH RFX ON ABNORMAL TO FREE T4  -     REFERRAL TO ENDOCRINOLOGY  -     patricia,Arun Miranda,,Tet Vac-PF (ADACEL) 2 Lf-(2.5-5-3-5 mcg)-5Lf/0.5 mL susp; 0.5 mL by IntraMUSCular route once for 1 dose. 2. Hyperthyroidism  Comments:  Was being managed by Dr. Pennie Vega. Needs new endocrinologist.  I am checking TFTs today. 3. Essential hypertension  Comments: At goal.  Continue current regimen.   Increase activity to 120 minutes of moderate intensity exercise weekly. DASH diet encouraged. Orders:  -     amLODIPine (NORVASC) 5 mg tablet; Take 1 Tablet by mouth daily.  -     CBC W/O DIFF  -     METABOLIC PANEL, COMPREHENSIVE    4. Leukopenia, unspecified type  Comments:  Previously evaluated extensively by hematology and has been released from their care. 5. Need for hepatitis C screening test    6. Encounter for screening for cardiovascular disorders  -     METABOLIC PANEL, COMPREHENSIVE  -     LIPID PANEL         Follow-up and Dispositions    Return in about 6 months (around 5/18/2023) for chronic follow up. I discussed the aforementioned diagnoses with the patient as well as the plan of care. All questions were answered and an AVS was provided.        Deon Miller MD  [address] [place] [address]

## 2022-11-18 NOTE — PROGRESS NOTES
1. \"Have you been to the ER, urgent care clinic since your last visit? Hospitalized since your last visit? \" No    2. \"Have you seen or consulted any other health care providers outside of the [address] [place] [address] since your last visit? \" No     3. For patients aged 39-70: Has the patient had a colonoscopy / FIT/ Cologuard? No      If the patient is female:    4. For patients aged 41-77: Has the patient had a mammogram within the past 2 years? NA - based on age or sex      11. For patients aged 21-65: Has the patient had a pap smear?  NA - based on age or sex  Visit Vitals  /87 (BP 1 Location: Right upper arm, BP Patient Position: Sitting, BP Cuff Size: Adult)   Pulse 82   Temp 97.2 °F (36.2 °C) (Temporal)   Resp 18   Ht 5' 5\" (1.651 m)   Wt 189 lb 3.2 oz (85.8 kg)   SpO2 98%   BMI 31.48 kg/m²     Chief Complaint   Patient presents with    Physical

## 2022-11-19 LAB
ALBUMIN SERPL-MCNC: 4.4 G/DL (ref 3.8–4.9)
ALBUMIN/GLOB SERPL: 1.5 {RATIO} (ref 1.2–2.2)
ALP SERPL-CCNC: 89 IU/L (ref 44–121)
ALT SERPL-CCNC: 19 IU/L (ref 0–44)
AST SERPL-CCNC: 15 IU/L (ref 0–40)
BILIRUB SERPL-MCNC: <0.2 MG/DL (ref 0–1.2)
BUN SERPL-MCNC: 14 MG/DL (ref 6–24)
BUN/CREAT SERPL: 14 (ref 9–20)
CALCIUM SERPL-MCNC: 9.2 MG/DL (ref 8.7–10.2)
CHLORIDE SERPL-SCNC: 102 MMOL/L (ref 96–106)
CHOLEST SERPL-MCNC: 179 MG/DL (ref 100–199)
CO2 SERPL-SCNC: 25 MMOL/L (ref 20–29)
CREAT SERPL-MCNC: 1.02 MG/DL (ref 0.76–1.27)
EGFR: 87 ML/MIN/1.73
ERYTHROCYTE [DISTWIDTH] IN BLOOD BY AUTOMATED COUNT: 14 % (ref 11.6–15.4)
EST. AVERAGE GLUCOSE BLD GHB EST-MCNC: 114 MG/DL
GLOBULIN SER CALC-MCNC: 2.9 G/DL (ref 1.5–4.5)
GLUCOSE SERPL-MCNC: 87 MG/DL (ref 70–99)
HBA1C MFR BLD: 5.6 % (ref 4.8–5.6)
HCT VFR BLD AUTO: 40.2 % (ref 37.5–51)
HCV AB S/CO SERPL IA: 0.1 S/CO RATIO (ref 0–0.9)
HCV AB SERPL QL IA: NORMAL
HDLC SERPL-MCNC: 41 MG/DL
HGB BLD-MCNC: 13.5 G/DL (ref 13–17.7)
LDLC SERPL CALC-MCNC: 125 MG/DL (ref 0–99)
MCH RBC QN AUTO: 28.5 PG (ref 26.6–33)
MCHC RBC AUTO-ENTMCNC: 33.6 G/DL (ref 31.5–35.7)
MCV RBC AUTO: 85 FL (ref 79–97)
PLATELET # BLD AUTO: 187 X10E3/UL (ref 150–450)
POTASSIUM SERPL-SCNC: 4.1 MMOL/L (ref 3.5–5.2)
PROT SERPL-MCNC: 7.3 G/DL (ref 6–8.5)
RBC # BLD AUTO: 4.74 X10E6/UL (ref 4.14–5.8)
SODIUM SERPL-SCNC: 141 MMOL/L (ref 134–144)
TRIGL SERPL-MCNC: 70 MG/DL (ref 0–149)
TSH SERPL DL<=0.005 MIU/L-ACNC: 1.29 UIU/ML (ref 0.45–4.5)
VLDLC SERPL CALC-MCNC: 13 MG/DL (ref 5–40)
WBC # BLD AUTO: 3.4 X10E3/UL (ref 3.4–10.8)

## 2022-11-23 NOTE — PROGRESS NOTES
Subblime message sent:    Good afternoon morning,    Your labs have returned. Overall everything looks okay. Your thyroid function was within normal limits. Your screening for diabetes was negative. Your blood counts, kidney function, liver function, electrolytes within normal limits. Your hepatitis C screening was negative. Your LDL, the bad cholesterol, was slightly elevated but not quite at the level where we need to start medication yet. Please work on a healthy diet that is lower in red meat and pork as well as lower in processed and fried foods. We will monitor this on future labs. Thank you for the privilege to participate in your healthcare and please reach out should you have any further questions.      Happy holidays,    Dr. Gould

## 2023-05-16 SDOH — ECONOMIC STABILITY: FOOD INSECURITY: WITHIN THE PAST 12 MONTHS, THE FOOD YOU BOUGHT JUST DIDN'T LAST AND YOU DIDN'T HAVE MONEY TO GET MORE.: NEVER TRUE

## 2023-05-16 SDOH — ECONOMIC STABILITY: FOOD INSECURITY: WITHIN THE PAST 12 MONTHS, YOU WORRIED THAT YOUR FOOD WOULD RUN OUT BEFORE YOU GOT MONEY TO BUY MORE.: NEVER TRUE

## 2023-05-16 SDOH — ECONOMIC STABILITY: HOUSING INSECURITY
IN THE LAST 12 MONTHS, WAS THERE A TIME WHEN YOU DID NOT HAVE A STEADY PLACE TO SLEEP OR SLEPT IN A SHELTER (INCLUDING NOW)?: NO

## 2023-05-16 SDOH — ECONOMIC STABILITY: TRANSPORTATION INSECURITY
IN THE PAST 12 MONTHS, HAS LACK OF TRANSPORTATION KEPT YOU FROM MEETINGS, WORK, OR FROM GETTING THINGS NEEDED FOR DAILY LIVING?: NO

## 2023-05-16 SDOH — ECONOMIC STABILITY: INCOME INSECURITY: HOW HARD IS IT FOR YOU TO PAY FOR THE VERY BASICS LIKE FOOD, HOUSING, MEDICAL CARE, AND HEATING?: NOT HARD AT ALL

## 2023-05-18 ENCOUNTER — TELEMEDICINE (OUTPATIENT)
Dept: PRIMARY CARE CLINIC | Facility: CLINIC | Age: 53
End: 2023-05-18
Payer: COMMERCIAL

## 2023-05-18 DIAGNOSIS — E05.90 HYPERTHYROIDISM: Primary | ICD-10-CM

## 2023-05-18 DIAGNOSIS — D72.819 LEUKOPENIA, UNSPECIFIED TYPE: ICD-10-CM

## 2023-05-18 DIAGNOSIS — I10 ESSENTIAL HYPERTENSION: ICD-10-CM

## 2023-05-18 DIAGNOSIS — E05.90 HYPERTHYROIDISM: ICD-10-CM

## 2023-05-18 PROCEDURE — 99214 OFFICE O/P EST MOD 30 MIN: CPT | Performed by: FAMILY MEDICINE

## 2023-05-18 RX ORDER — AMLODIPINE BESYLATE 5 MG/1
5 TABLET ORAL DAILY
Qty: 90 TABLET | Refills: 1 | Status: SHIPPED | OUTPATIENT
Start: 2023-05-18

## 2023-05-18 ASSESSMENT — PATIENT HEALTH QUESTIONNAIRE - PHQ9
2. FEELING DOWN, DEPRESSED OR HOPELESS: 0
SUM OF ALL RESPONSES TO PHQ QUESTIONS 1-9: 0
1. LITTLE INTEREST OR PLEASURE IN DOING THINGS: 0
SUM OF ALL RESPONSES TO PHQ9 QUESTIONS 1 & 2: 0
SUM OF ALL RESPONSES TO PHQ QUESTIONS 1-9: 0

## 2023-05-18 ASSESSMENT — ENCOUNTER SYMPTOMS
SHORTNESS OF BREATH: 0
COUGH: 0

## 2023-05-18 NOTE — PROGRESS NOTES
Identified Patient with two Patient identifiers(name and ). 1. Have you been to the ER, urgent care clinic since your last visit? Hospitalized since your last visit? No    2. Have you seen or consulted any other health care providers outside of the [address] [place] [address] since your last visit? Endo, Dr. Azael Rey      3. For patients aged 39-70: Has the patient had a colonoscopy / FIT/ Cologuard? Yes - Care Gap present. Most recent result on file. If the patient is female:    4. For patients aged 41-77: Has the patient had a mammogram within the past 2 years? NA - based on age/sex      5. For patients aged 21-65: Has the patient had a pap smear? NA - based on age/sex   There were no vitals taken for this visit. Health Maintenance Due   Topic Date Due    HIV screen  Never done    DTaP/Tdap/Td vaccine (1 - Tdap) Never done    Shingles vaccine (1 of 2) Never done    COVID-19 Vaccine (4 - Booster for [employer] series) 2021      [facility] Social Determinants Of Health Questionnaire       Question 2023  9:12 PM SHAUNT Domi Leyva by Patient    How hard is it for you to pay for the very basics like food, housing, medical care, and heating? Not hard at all    Within the past 12 months, you worried that your food would run out before you got the money to buy more. Never true    Within the past 12 months, the food you bought just didnt last and you didnt have money to get more. Never true    In the past 12 months, has lack of transportation kept you from meetings, work, or from getting things needed for daily living? No    In the last 12 months, was there a time when you did not have a steady place to sleep or slept in a shelter (including now)? No    Would you like resources for any of these topics? No, thank you           [facility] Vitals Questionnaire       Question 2023  7:44 AM SHAUNT Domi Leyva by Patient    What is your height? 5.5    What is your weight in pounds?  185    What is your top

## 2023-05-18 NOTE — PROGRESS NOTES
Dorothy Aguirre (:  1970) is a Established patient, presenting virtually for evaluation of the following:    Assessment & Plan   Below is the assessment and plan developed based on review of pertinent history, physical exam, labs, studies, and medications. 1. Hyperthyroidism  Comments:  Has been controlled and no longer required meds. Checking TSH today. Orders:  -     CBC with Auto Differential; Future  -     Lipid Panel; Future  2. Essential hypertension  Comments:  Has been at goal. Continue current regimen. Orders:  -     CBC with Auto Differential; Future  -     Lipid Panel; Future  3. Leukopenia, unspecified type  Comments:  Resolved. Orders:  -     CBC with Auto Differential; Future    Return in about 6 months (around 2023) for Annual Exam.       Subjective   Hypothyroidism: Patient was following with Dr. Светлана Matthews, who has since left her practice. He now follows with Dr. Anam Martinez and labs have consistently been normal.  If next set is normal, she will release his care. Background hx: Patient had labs that indicated subclinical hypothyroidism and a thyroid uptake scan which showed uniform distribution. Patient was symptomatic with palpitations and increased bowel movement so he was started on methimazole 5 mg daily back in 2020. His symptoms were improving so they titrated him down to methimazole 5 mg every other day before eventually stopping the methimazole. He has continued to be asymptomatic. HTN: has been controlled on current regimen. Leukopenia: Was evaluated by hematology and eventually released from their care as labs remained stable. Review of Systems   Constitutional:  Negative for chills and fever. Respiratory:  Negative for cough and shortness of breath. Cardiovascular:  Negative for chest pain and palpitations. Psychiatric/Behavioral:  Negative for hallucinations and suicidal ideas.          Objective   Patient-Reported Vitals  BP

## 2023-05-20 LAB
BASOPHILS # BLD AUTO: 0 X10E3/UL (ref 0–0.2)
BASOPHILS NFR BLD AUTO: 0 %
CHOLEST SERPL-MCNC: 170 MG/DL (ref 100–199)
EOSINOPHIL # BLD AUTO: 0.2 X10E3/UL (ref 0–0.4)
EOSINOPHIL NFR BLD AUTO: 6 %
ERYTHROCYTE [DISTWIDTH] IN BLOOD BY AUTOMATED COUNT: 13.7 % (ref 11.6–15.4)
HCT VFR BLD AUTO: 40.5 % (ref 37.5–51)
HDLC SERPL-MCNC: 41 MG/DL
HGB BLD-MCNC: 13.7 G/DL (ref 13–17.7)
IMM GRANULOCYTES # BLD AUTO: 0 X10E3/UL (ref 0–0.1)
IMM GRANULOCYTES NFR BLD AUTO: 0 %
LDLC SERPL CALC-MCNC: 116 MG/DL (ref 0–99)
LYMPHOCYTES # BLD AUTO: 1.2 X10E3/UL (ref 0.7–3.1)
LYMPHOCYTES NFR BLD AUTO: 40 %
MCH RBC QN AUTO: 28.4 PG (ref 26.6–33)
MCHC RBC AUTO-ENTMCNC: 33.8 G/DL (ref 31.5–35.7)
MCV RBC AUTO: 84 FL (ref 79–97)
MONOCYTES # BLD AUTO: 0.4 X10E3/UL (ref 0.1–0.9)
MONOCYTES NFR BLD AUTO: 14 %
NEUTROPHILS # BLD AUTO: 1.2 X10E3/UL (ref 1.4–7)
NEUTROPHILS NFR BLD AUTO: 40 %
PLATELET # BLD AUTO: 171 X10E3/UL (ref 150–450)
RBC # BLD AUTO: 4.83 X10E6/UL (ref 4.14–5.8)
TRIGL SERPL-MCNC: 69 MG/DL (ref 0–149)
VLDLC SERPL CALC-MCNC: 13 MG/DL (ref 5–40)
WBC # BLD AUTO: 2.9 X10E3/UL (ref 3.4–10.8)

## 2023-08-09 NOTE — TELEPHONE ENCOUNTER
----- Message from Juan Pace, [address] sent at 8/9/2023  2:32 PM EDT -----  Subject: Refill Request    QUESTIONS  Name of Medication? amLODIPine (NORVASC) 5 MG tablet  Patient-reported dosage and instructions? 5mg daily   How many days do you have left? 9  Preferred Pharmacy? [id number]  Pharmacy phone number (if available)? [phone]  ---------------------------------------------------------------------------  --------------  CALL BACK INFO  What is the best way for the office to contact you? OK to leave message on   voicemail  Preferred Call Back Phone Number? [phone]  ---------------------------------------------------------------------------  --------------  SCRIPT ANSWERS  Relationship to Patient?  Self

## 2023-08-09 NOTE — TELEPHONE ENCOUNTER
----- Message from Byronanlaureano Nette, [address] sent at 8/9/2023  2:32 PM EDT -----  Subject: Refill Request    QUESTIONS  Name of Medication? amLODIPine (NORVASC) 5 MG tablet  Patient-reported dosage and instructions? 5mg daily   How many days do you have left? 9  Preferred Pharmacy? [id number]  Pharmacy phone number (if available)? [phone]  ---------------------------------------------------------------------------  --------------  CALL BACK INFO  What is the best way for the office to contact you? OK to leave message on   voicemail  Preferred Call Back Phone Number? [phone]  ---------------------------------------------------------------------------  --------------  SCRIPT ANSWERS  Relationship to Patient?  Self

## 2023-08-10 RX ORDER — AMLODIPINE BESYLATE 5 MG/1
5 TABLET ORAL DAILY
Qty: 90 TABLET | Refills: 1 | Status: SHIPPED | OUTPATIENT
Start: 2023-08-10

## 2023-12-05 ENCOUNTER — OFFICE VISIT (OUTPATIENT)
Dept: PRIMARY CARE CLINIC | Facility: CLINIC | Age: 53
End: 2023-12-05
Payer: COMMERCIAL

## 2023-12-05 VITALS
TEMPERATURE: 98 F | HEIGHT: 65 IN | WEIGHT: 177 LBS | DIASTOLIC BLOOD PRESSURE: 88 MMHG | HEART RATE: 85 BPM | SYSTOLIC BLOOD PRESSURE: 126 MMHG | BODY MASS INDEX: 29.49 KG/M2

## 2023-12-05 DIAGNOSIS — I10 ESSENTIAL HYPERTENSION: Primary | ICD-10-CM

## 2023-12-05 DIAGNOSIS — D72.819 LEUKOPENIA, UNSPECIFIED TYPE: ICD-10-CM

## 2023-12-05 DIAGNOSIS — Z12.5 SCREENING FOR PROSTATE CANCER: ICD-10-CM

## 2023-12-05 DIAGNOSIS — E78.2 MIXED HYPERLIPIDEMIA: ICD-10-CM

## 2023-12-05 DIAGNOSIS — E05.90 HYPERTHYROIDISM: ICD-10-CM

## 2023-12-05 PROCEDURE — 3079F DIAST BP 80-89 MM HG: CPT | Performed by: NURSE PRACTITIONER

## 2023-12-05 PROCEDURE — 99214 OFFICE O/P EST MOD 30 MIN: CPT | Performed by: NURSE PRACTITIONER

## 2023-12-05 PROCEDURE — 3074F SYST BP LT 130 MM HG: CPT | Performed by: NURSE PRACTITIONER

## 2023-12-05 SDOH — ECONOMIC STABILITY: FOOD INSECURITY: WITHIN THE PAST 12 MONTHS, YOU WORRIED THAT YOUR FOOD WOULD RUN OUT BEFORE YOU GOT MONEY TO BUY MORE.: NEVER TRUE

## 2023-12-05 SDOH — ECONOMIC STABILITY: FOOD INSECURITY: WITHIN THE PAST 12 MONTHS, THE FOOD YOU BOUGHT JUST DIDN'T LAST AND YOU DIDN'T HAVE MONEY TO GET MORE.: NEVER TRUE

## 2023-12-05 SDOH — ECONOMIC STABILITY: INCOME INSECURITY: HOW HARD IS IT FOR YOU TO PAY FOR THE VERY BASICS LIKE FOOD, HOUSING, MEDICAL CARE, AND HEATING?: NOT HARD AT ALL

## 2023-12-05 ASSESSMENT — ENCOUNTER SYMPTOMS
GASTROINTESTINAL NEGATIVE: 1
RESPIRATORY NEGATIVE: 1

## 2023-12-05 ASSESSMENT — PATIENT HEALTH QUESTIONNAIRE - PHQ9
1. LITTLE INTEREST OR PLEASURE IN DOING THINGS: 0
SUM OF ALL RESPONSES TO PHQ QUESTIONS 1-9: 0
SUM OF ALL RESPONSES TO PHQ QUESTIONS 1-9: 0
2. FEELING DOWN, DEPRESSED OR HOPELESS: 0
SUM OF ALL RESPONSES TO PHQ QUESTIONS 1-9: 0
SUM OF ALL RESPONSES TO PHQ QUESTIONS 1-9: 0
SUM OF ALL RESPONSES TO PHQ9 QUESTIONS 1 & 2: 0

## 2023-12-05 NOTE — PROGRESS NOTES
Identified Patient with two Patient identifiers(name and ). 1. Have you been to the ER, urgent care clinic since your last visit? Hospitalized since your last visit? No    2. Have you seen or consulted any other health care providers outside of the [address] [place] [address] since your last visit? No     3. For patients aged 43-73: Has the patient had a colonoscopy / FIT/ Cologuard? Yes-No Care Gap Present    If the patient is female:    4. For patients aged 43-66: Has the patient had a mammogram within the past 2 years? NA - based on age/sex      5. For patients aged 21-65: Has the patient had a pap smear? NA - based on age/sex   There were no vitals taken for this visit. Chief Complaint   Patient presents with    New Patient     Establish care and just here for a checkup. Most recently seen Dr. Valerie Boyce ([facility]) and released him. Health Maintenance Due   Topic Date Due    Hepatitis B vaccine (1 of 3 - 3-dose series) Never done    HIV screen  Never done    DTaP/Tdap/Td vaccine (1 - Tdap) Never done    Shingles vaccine (1 of 2) Never done    Flu vaccine (1) Never done    COVID-19 Vaccine (2023- season) 2023          No questionnaires available.

## 2023-12-06 LAB
ALBUMIN SERPL-MCNC: 4.3 G/DL (ref 3.8–4.9)
ALBUMIN/GLOB SERPL: 1.7 {RATIO} (ref 1.2–2.2)
ALP SERPL-CCNC: 96 IU/L (ref 44–121)
ALT SERPL-CCNC: 12 IU/L (ref 0–44)
AST SERPL-CCNC: 9 IU/L (ref 0–40)
BILIRUB SERPL-MCNC: 0.4 MG/DL (ref 0–1.2)
BUN SERPL-MCNC: 11 MG/DL (ref 6–24)
BUN/CREAT SERPL: 10 (ref 9–20)
CALCIUM SERPL-MCNC: 9.1 MG/DL (ref 8.7–10.2)
CHLORIDE SERPL-SCNC: 105 MMOL/L (ref 96–106)
CHOLEST SERPL-MCNC: 154 MG/DL (ref 100–199)
CO2 SERPL-SCNC: 22 MMOL/L (ref 20–29)
CREAT SERPL-MCNC: 1.05 MG/DL (ref 0.76–1.27)
EGFRCR SERPLBLD CKD-EPI 2021: 84 ML/MIN/1.73
ERYTHROCYTE [DISTWIDTH] IN BLOOD BY AUTOMATED COUNT: 13.6 % (ref 11.6–15.4)
GLOBULIN SER CALC-MCNC: 2.6 G/DL (ref 1.5–4.5)
GLUCOSE SERPL-MCNC: 109 MG/DL (ref 70–99)
HCT VFR BLD AUTO: 39.4 % (ref 37.5–51)
HDLC SERPL-MCNC: 43 MG/DL
HGB BLD-MCNC: 13.2 G/DL (ref 13–17.7)
LDLC SERPL CALC-MCNC: 99 MG/DL (ref 0–99)
MCH RBC QN AUTO: 28.6 PG (ref 26.6–33)
MCHC RBC AUTO-ENTMCNC: 33.5 G/DL (ref 31.5–35.7)
MCV RBC AUTO: 86 FL (ref 79–97)
PLATELET # BLD AUTO: 190 X10E3/UL (ref 150–450)
POTASSIUM SERPL-SCNC: 4 MMOL/L (ref 3.5–5.2)
PROT SERPL-MCNC: 6.9 G/DL (ref 6–8.5)
PSA SERPL-MCNC: 0.9 NG/ML (ref 0–4)
RBC # BLD AUTO: 4.61 X10E6/UL (ref 4.14–5.8)
SODIUM SERPL-SCNC: 143 MMOL/L (ref 134–144)
TRIGL SERPL-MCNC: 59 MG/DL (ref 0–149)
VLDLC SERPL CALC-MCNC: 12 MG/DL (ref 5–40)
WBC # BLD AUTO: 3.1 X10E3/UL (ref 3.4–10.8)

## 2024-02-15 RX ORDER — AMLODIPINE BESYLATE 5 MG/1
5 TABLET ORAL DAILY
Qty: 90 TABLET | Refills: 1 | Status: SHIPPED | OUTPATIENT
Start: 2024-02-15

## 2024-06-05 ENCOUNTER — OFFICE VISIT (OUTPATIENT)
Dept: PRIMARY CARE CLINIC | Facility: CLINIC | Age: 54
End: 2024-06-05
Payer: COMMERCIAL

## 2024-06-05 VITALS
DIASTOLIC BLOOD PRESSURE: 80 MMHG | WEIGHT: 176 LBS | HEART RATE: 65 BPM | TEMPERATURE: 98.3 F | SYSTOLIC BLOOD PRESSURE: 123 MMHG | HEIGHT: 65 IN | BODY MASS INDEX: 29.32 KG/M2

## 2024-06-05 DIAGNOSIS — E05.90 HYPERTHYROIDISM: ICD-10-CM

## 2024-06-05 DIAGNOSIS — I10 ESSENTIAL HYPERTENSION: Primary | ICD-10-CM

## 2024-06-05 DIAGNOSIS — D72.819 LEUKOPENIA, UNSPECIFIED TYPE: ICD-10-CM

## 2024-06-05 DIAGNOSIS — E78.2 MIXED HYPERLIPIDEMIA: ICD-10-CM

## 2024-06-05 PROCEDURE — 3074F SYST BP LT 130 MM HG: CPT | Performed by: NURSE PRACTITIONER

## 2024-06-05 PROCEDURE — 3079F DIAST BP 80-89 MM HG: CPT | Performed by: NURSE PRACTITIONER

## 2024-06-05 PROCEDURE — 99214 OFFICE O/P EST MOD 30 MIN: CPT | Performed by: NURSE PRACTITIONER

## 2024-06-05 RX ORDER — AMLODIPINE BESYLATE 5 MG/1
5 TABLET ORAL DAILY
Qty: 90 TABLET | Refills: 1 | Status: SHIPPED | OUTPATIENT
Start: 2024-06-05

## 2024-06-05 ASSESSMENT — ENCOUNTER SYMPTOMS
BACK PAIN: 0
ABDOMINAL PAIN: 0
SHORTNESS OF BREATH: 0

## 2024-06-05 ASSESSMENT — PATIENT HEALTH QUESTIONNAIRE - PHQ9
SUM OF ALL RESPONSES TO PHQ QUESTIONS 1-9: 0
1. LITTLE INTEREST OR PLEASURE IN DOING THINGS: NOT AT ALL
2. FEELING DOWN, DEPRESSED OR HOPELESS: NOT AT ALL
SUM OF ALL RESPONSES TO PHQ QUESTIONS 1-9: 0
SUM OF ALL RESPONSES TO PHQ QUESTIONS 1-9: 0
SUM OF ALL RESPONSES TO PHQ9 QUESTIONS 1 & 2: 0
SUM OF ALL RESPONSES TO PHQ QUESTIONS 1-9: 0

## 2024-06-05 NOTE — PROGRESS NOTES
\"Have you been to the ER, urgent care clinic since your last visit?  Hospitalized since your last visit?\"    NO    “Have you seen or consulted any other health care providers outside of [facility] since your last visit?”    NO            Click Here for Release of Records Request

## 2024-06-05 NOTE — PROGRESS NOTES
Jaxson Martins is a 53 y.o. male presents for    Chief Complaint   Patient presents with    Follow-up     6 mos f/u. No acute issues. Takes male enhancement and vitamins with testoterone in it    .    ASSESSMENT and PLAN  Jaxson was seen today for follow-up.    Diagnoses and all orders for this visit:    Essential hypertension  Comments:  BP at goal on current medications. Refill sent.  Orders:  -     amLODIPine (NORVASC) 5 MG tablet; Take 1 tablet by mouth daily    Mixed hyperlipidemia  Comments:  diet contrlled; will recheck lipids.  Orders:  -     Comprehensive Metabolic Panel  -     Lipid Panel    Hyperthyroidism  Comments:  has been off medications; will recheck TSH to monitor.  Orders:  -     TSH + Free T4 Panel    Leukopenia, unspecified type  Comments:  history of low WBC; will continue to monitor.  Orders:  -     CBC             HISTORY OF PRESENT ILLNESS  Jaxson Martins is a 53 y.o. male presents for    Chief Complaint   Patient presents with    Follow-up     6 mos f/u. No acute issues. Takes male enhancement and vitamins with testoterone in it    .    HTN: Blood pressure at goal.  He is compliant with amlodipine, denies side effects. He denies headaches, dizziness or blurred vision.     Hyperthyroidism: he has been off medication for quite some time. He was seeing Dr. Lugo every 6 months but patient states they released him and told PCP can monitor labs.      HLD: he is not on statin, has been managing with diet and exercise. Lipids have improved on last labs with the diet/exercise changes.      Leukopenia: history of low WBCs in the past.     Patient states he has been walking one hour everyday.    Vitals:    06/05/24 0758   BP: 123/80   Site: Left Upper Arm   Position: Sitting   Cuff Size: Medium Adult   Pulse: 65   Temp: 98.3 °F (36.8 °C)   TempSrc: Oral   Weight: 79.8 kg (176 lb)   Height: 1.651 m (5' 5\")     Patient Active Problem List   Diagnosis    Leukopenia    Hyperthyroidism    Anemia    Blood

## 2024-06-06 LAB
ALBUMIN SERPL-MCNC: 4.5 G/DL (ref 3.8–4.9)
ALBUMIN/GLOB SERPL: 1.7 {RATIO} (ref 1.2–2.2)
ALP SERPL-CCNC: 97 IU/L (ref 44–121)
ALT SERPL-CCNC: 13 IU/L (ref 0–44)
AST SERPL-CCNC: 11 IU/L (ref 0–40)
BILIRUB SERPL-MCNC: 0.5 MG/DL (ref 0–1.2)
BUN SERPL-MCNC: 15 MG/DL (ref 6–24)
BUN/CREAT SERPL: 14 (ref 9–20)
CALCIUM SERPL-MCNC: 9.2 MG/DL (ref 8.7–10.2)
CHLORIDE SERPL-SCNC: 104 MMOL/L (ref 96–106)
CHOLEST SERPL-MCNC: 152 MG/DL (ref 100–199)
CO2 SERPL-SCNC: 26 MMOL/L (ref 20–29)
CREAT SERPL-MCNC: 1.07 MG/DL (ref 0.76–1.27)
EGFRCR SERPLBLD CKD-EPI 2021: 82 ML/MIN/1.73
ERYTHROCYTE [DISTWIDTH] IN BLOOD BY AUTOMATED COUNT: 13.9 % (ref 11.6–15.4)
GLOBULIN SER CALC-MCNC: 2.6 G/DL (ref 1.5–4.5)
GLUCOSE SERPL-MCNC: 88 MG/DL (ref 70–99)
HCT VFR BLD AUTO: 39.9 % (ref 37.5–51)
HDLC SERPL-MCNC: 49 MG/DL
HGB BLD-MCNC: 13.3 G/DL (ref 13–17.7)
LDLC SERPL CALC-MCNC: 92 MG/DL (ref 0–99)
MCH RBC QN AUTO: 28.5 PG (ref 26.6–33)
MCHC RBC AUTO-ENTMCNC: 33.3 G/DL (ref 31.5–35.7)
MCV RBC AUTO: 85 FL (ref 79–97)
PLATELET # BLD AUTO: 191 X10E3/UL (ref 150–450)
POTASSIUM SERPL-SCNC: 4.2 MMOL/L (ref 3.5–5.2)
PROT SERPL-MCNC: 7.1 G/DL (ref 6–8.5)
RBC # BLD AUTO: 4.67 X10E6/UL (ref 4.14–5.8)
SODIUM SERPL-SCNC: 142 MMOL/L (ref 134–144)
T4 FREE SERPL-MCNC: 1.37 NG/DL (ref 0.82–1.77)
TRIGL SERPL-MCNC: 54 MG/DL (ref 0–149)
TSH SERPL DL<=0.005 MIU/L-ACNC: 1.64 UIU/ML (ref 0.45–4.5)
VLDLC SERPL CALC-MCNC: 11 MG/DL (ref 5–40)
WBC # BLD AUTO: 3.1 X10E3/UL (ref 3.4–10.8)

## 2025-01-14 ENCOUNTER — OFFICE VISIT (OUTPATIENT)
Dept: PRIMARY CARE CLINIC | Facility: CLINIC | Age: 55
End: 2025-01-14
Payer: COMMERCIAL

## 2025-01-14 VITALS
SYSTOLIC BLOOD PRESSURE: 125 MMHG | OXYGEN SATURATION: 100 % | WEIGHT: 182 LBS | DIASTOLIC BLOOD PRESSURE: 86 MMHG | TEMPERATURE: 98.3 F | BODY MASS INDEX: 30.32 KG/M2 | HEART RATE: 57 BPM | HEIGHT: 65 IN | RESPIRATION RATE: 16 BRPM

## 2025-01-14 DIAGNOSIS — E78.2 MIXED HYPERLIPIDEMIA: ICD-10-CM

## 2025-01-14 DIAGNOSIS — Z12.5 SCREENING FOR PROSTATE CANCER: ICD-10-CM

## 2025-01-14 DIAGNOSIS — I10 ESSENTIAL HYPERTENSION: Primary | ICD-10-CM

## 2025-01-14 DIAGNOSIS — E05.90 HYPERTHYROIDISM: ICD-10-CM

## 2025-01-14 DIAGNOSIS — D72.819 LEUKOPENIA, UNSPECIFIED TYPE: ICD-10-CM

## 2025-01-14 PROCEDURE — 3079F DIAST BP 80-89 MM HG: CPT | Performed by: NURSE PRACTITIONER

## 2025-01-14 PROCEDURE — 99214 OFFICE O/P EST MOD 30 MIN: CPT | Performed by: NURSE PRACTITIONER

## 2025-01-14 PROCEDURE — 3074F SYST BP LT 130 MM HG: CPT | Performed by: NURSE PRACTITIONER

## 2025-01-14 RX ORDER — MULTIVITAMIN
1 TABLET ORAL DAILY
COMMUNITY

## 2025-01-14 RX ORDER — AMLODIPINE BESYLATE 5 MG/1
5 TABLET ORAL DAILY
Qty: 90 TABLET | Refills: 1 | Status: SHIPPED | OUTPATIENT
Start: 2025-01-14

## 2025-01-14 SDOH — ECONOMIC STABILITY: FOOD INSECURITY: WITHIN THE PAST 12 MONTHS, YOU WORRIED THAT YOUR FOOD WOULD RUN OUT BEFORE YOU GOT MONEY TO BUY MORE.: PATIENT DECLINED

## 2025-01-14 SDOH — ECONOMIC STABILITY: FOOD INSECURITY: WITHIN THE PAST 12 MONTHS, THE FOOD YOU BOUGHT JUST DIDN'T LAST AND YOU DIDN'T HAVE MONEY TO GET MORE.: PATIENT DECLINED

## 2025-01-14 ASSESSMENT — ENCOUNTER SYMPTOMS: SHORTNESS OF BREATH: 0

## 2025-01-14 ASSESSMENT — PATIENT HEALTH QUESTIONNAIRE - PHQ9
SUM OF ALL RESPONSES TO PHQ QUESTIONS 1-9: 0
2. FEELING DOWN, DEPRESSED OR HOPELESS: NOT AT ALL
SUM OF ALL RESPONSES TO PHQ QUESTIONS 1-9: 0
SUM OF ALL RESPONSES TO PHQ9 QUESTIONS 1 & 2: 0
SUM OF ALL RESPONSES TO PHQ QUESTIONS 1-9: 0
SUM OF ALL RESPONSES TO PHQ QUESTIONS 1-9: 0
1. LITTLE INTEREST OR PLEASURE IN DOING THINGS: NOT AT ALL

## 2025-01-14 NOTE — PROGRESS NOTES
Jaxson Martins is a 54 y.o. male presents for    Chief Complaint   Patient presents with    Follow-up     6 month f/u. Patient has general questions no concerns. Wants to ask if can donate blood. How often does he need shingles vaccine.     .    ASSESSMENT and PLAN  Jaxson was seen today for follow-up.    Diagnoses and all orders for this visit:    Essential hypertension  Comments:  BP at goal on current medications. Refill sent.  Orders:  -     amLODIPine (NORVASC) 5 MG tablet; Take 1 tablet by mouth daily    Mixed hyperlipidemia  Comments:  diet controlled; will recheck labs  Orders:  -     CBC  -     Comprehensive Metabolic Panel  -     Lipid Panel    Hyperthyroidism  Comments:  off medications, will recheck labs to monitor.  Orders:  -     TSH + Free T4 Panel    Leukopenia, unspecified type  Comments:  monitoring CBC    Screening for prostate cancer  -     PSA Screening             HISTORY OF PRESENT ILLNESS  Jaxson Martins is a 54 y.o. male presents for    Chief Complaint   Patient presents with    Follow-up     6 month f/u. Patient has general questions no concerns. Wants to ask if can donate blood. How often does he need shingles vaccine.     .    HTN: Blood pressure at goal on amlodipine.  He denies headaches, dizziness or blurred vision.     Hyperthyroidism: he has been off medication for quite some time. He was seeing Endocrinology but was released since labs were stable.     HLD: he is not on statin, has been managing with diet and exercise. Lipids have been stable off medications     Leukopenia: history of low WBCs in the past.     Patient is active, walking everyday.  He sleeps well.    Colonoscopy is UTD.     Patient reports he used to be an avid blood donor. When he was seeing his endocrinologist, he was told to stop but he is not sure why. He would like to start donating again if possible.     Vitals:    01/14/25 0723   BP: 125/86   Site: Left Upper Arm   Position: Sitting   Cuff Size: Large Adult

## 2025-01-15 LAB
ALBUMIN SERPL-MCNC: 4.3 G/DL (ref 3.8–4.9)
ALP SERPL-CCNC: 87 IU/L (ref 44–121)
ALT SERPL-CCNC: 13 IU/L (ref 0–44)
AST SERPL-CCNC: 16 IU/L (ref 0–40)
BILIRUB SERPL-MCNC: 0.3 MG/DL (ref 0–1.2)
BUN SERPL-MCNC: 12 MG/DL (ref 6–24)
BUN/CREAT SERPL: 11 (ref 9–20)
CALCIUM SERPL-MCNC: 9.5 MG/DL (ref 8.7–10.2)
CHLORIDE SERPL-SCNC: 104 MMOL/L (ref 96–106)
CHOLEST SERPL-MCNC: 167 MG/DL (ref 100–199)
CO2 SERPL-SCNC: 24 MMOL/L (ref 20–29)
CREAT SERPL-MCNC: 1.14 MG/DL (ref 0.76–1.27)
EGFRCR SERPLBLD CKD-EPI 2021: 75 ML/MIN/1.73
ERYTHROCYTE [DISTWIDTH] IN BLOOD BY AUTOMATED COUNT: 13.5 % (ref 11.6–15.4)
GLOBULIN SER CALC-MCNC: 2.9 G/DL (ref 1.5–4.5)
GLUCOSE SERPL-MCNC: 91 MG/DL (ref 70–99)
HCT VFR BLD AUTO: 39.6 % (ref 37.5–51)
HDLC SERPL-MCNC: 46 MG/DL
HGB BLD-MCNC: 13.4 G/DL (ref 13–17.7)
LDLC SERPL CALC-MCNC: 109 MG/DL (ref 0–99)
MCH RBC QN AUTO: 29.2 PG (ref 26.6–33)
MCHC RBC AUTO-ENTMCNC: 33.8 G/DL (ref 31.5–35.7)
MCV RBC AUTO: 86 FL (ref 79–97)
PLATELET # BLD AUTO: 168 X10E3/UL (ref 150–450)
POTASSIUM SERPL-SCNC: 4.1 MMOL/L (ref 3.5–5.2)
PROT SERPL-MCNC: 7.2 G/DL (ref 6–8.5)
PSA SERPL-MCNC: 0.9 NG/ML (ref 0–4)
RBC # BLD AUTO: 4.59 X10E6/UL (ref 4.14–5.8)
SODIUM SERPL-SCNC: 141 MMOL/L (ref 134–144)
TRIGL SERPL-MCNC: 59 MG/DL (ref 0–149)
VLDLC SERPL CALC-MCNC: 12 MG/DL (ref 5–40)
WBC # BLD AUTO: 2.8 X10E3/UL (ref 3.4–10.8)

## 2025-01-16 LAB
T4 FREE SERPL-MCNC: 1.28 NG/DL (ref 0.82–1.77)
TSH SERPL DL<=0.005 MIU/L-ACNC: 2.25 UIU/ML (ref 0.45–4.5)

## 2025-07-17 ENCOUNTER — OFFICE VISIT (OUTPATIENT)
Dept: PRIMARY CARE CLINIC | Facility: CLINIC | Age: 55
End: 2025-07-17
Payer: COMMERCIAL

## 2025-07-17 VITALS
DIASTOLIC BLOOD PRESSURE: 83 MMHG | RESPIRATION RATE: 16 BRPM | HEIGHT: 65 IN | SYSTOLIC BLOOD PRESSURE: 119 MMHG | BODY MASS INDEX: 29.66 KG/M2 | WEIGHT: 178 LBS | OXYGEN SATURATION: 97 %

## 2025-07-17 DIAGNOSIS — E78.2 MIXED HYPERLIPIDEMIA: ICD-10-CM

## 2025-07-17 DIAGNOSIS — D72.819 LEUKOPENIA, UNSPECIFIED TYPE: ICD-10-CM

## 2025-07-17 DIAGNOSIS — I10 ESSENTIAL HYPERTENSION: Primary | ICD-10-CM

## 2025-07-17 DIAGNOSIS — E05.90 HYPERTHYROIDISM: ICD-10-CM

## 2025-07-17 DIAGNOSIS — R53.83 FATIGUE, UNSPECIFIED TYPE: ICD-10-CM

## 2025-07-17 DIAGNOSIS — H61.22 IMPACTED CERUMEN, LEFT EAR: ICD-10-CM

## 2025-07-17 PROCEDURE — 3074F SYST BP LT 130 MM HG: CPT | Performed by: NURSE PRACTITIONER

## 2025-07-17 PROCEDURE — 3079F DIAST BP 80-89 MM HG: CPT | Performed by: NURSE PRACTITIONER

## 2025-07-17 PROCEDURE — 99214 OFFICE O/P EST MOD 30 MIN: CPT | Performed by: NURSE PRACTITIONER

## 2025-07-17 PROCEDURE — 69209 REMOVE IMPACTED EAR WAX UNI: CPT | Performed by: NURSE PRACTITIONER

## 2025-07-17 RX ORDER — AMLODIPINE BESYLATE 5 MG/1
5 TABLET ORAL DAILY
Qty: 90 TABLET | Refills: 1 | Status: SHIPPED | OUTPATIENT
Start: 2025-07-17

## 2025-07-17 ASSESSMENT — ENCOUNTER SYMPTOMS: SHORTNESS OF BREATH: 0

## 2025-07-17 NOTE — PROGRESS NOTES
Have you been to the ER, urgent care clinic since your last visit?  Hospitalized since your last visit?   NO    Have you seen or consulted any other health care providers outside our system since your last visit?   NO    Chief Complaint   Patient presents with    Follow-up       /83 (BP Site: Right Upper Arm, Patient Position: Sitting, BP Cuff Size: Large Adult)   Resp 16   Ht 1.651 m (5' 5\")   Wt 80.7 kg (178 lb)   SpO2 97%   BMI 29.62 kg/m²

## 2025-07-17 NOTE — PROGRESS NOTES
Jaxson Martins is a 55 y.o. male presents for    Chief Complaint   Patient presents with    Follow-up    .    ASSESSMENT and PLAN  Jaxson was seen today for follow-up.    Diagnoses and all orders for this visit:    Essential hypertension  Comments:  BP at goal on current medications. Refill sent.  Orders:  -     amLODIPine (NORVASC) 5 MG tablet; Take 1 tablet by mouth daily    Mixed hyperlipidemia  -     CBC  -     Comprehensive Metabolic Panel  -     Lipid Panel    Hyperthyroidism  Comments:  off meds and labs have been stable.  Orders:  -     TSH + Free T4 Panel    Leukopenia, unspecified type  Comments:  monitoring labs    Fatigue, unspecified type  -     Testosterone, free, total    Impacted cerumen, left ear  Comments:  tip of qtip came out of ear; pt reports improvement in hearing  Orders:  -     REMOVAL IMPACTED CERUMEN IRRIGATION/LVG UNILAT             HISTORY OF PRESENT ILLNESS  Jaxson Martins is a 55 y.o. male presents for    Chief Complaint   Patient presents with    Follow-up    .    HTN: Blood pressure at goal on amlodipine.  He denies headaches, dizziness or blurred vision.     Hyperthyroidism: he has been off medication for quite some time. He was seeing Endocrinology but was released since labs were stable.     HLD: he is not on statin, has been managing with diet and exercise. Lipids have been stable off medications     Leukopenia: history of low WBCs in the past.     Patient reports still walking daily. Usually will do 5 miles or so.    Patient reports he feels like something is in his left ear. Reports he was using a qtip a few months ago and has felt funny since. He uses q tips regularly to clean his ears    Vitals:    07/17/25 1044   BP: 119/83   BP Site: Right Upper Arm   Patient Position: Sitting   BP Cuff Size: Large Adult   Resp: 16   TempSrc: Oral   SpO2: 97%   Weight: 80.7 kg (178 lb)   Height: 1.651 m (5' 5\")     Patient Active Problem List   Diagnosis    Leukopenia    Hyperthyroidism

## 2025-07-18 LAB
ALBUMIN SERPL-MCNC: 4.1 G/DL (ref 3.8–4.9)
ALP SERPL-CCNC: 91 IU/L (ref 44–121)
ALT SERPL-CCNC: 11 IU/L (ref 0–44)
AST SERPL-CCNC: 14 IU/L (ref 0–40)
BILIRUB SERPL-MCNC: 0.2 MG/DL (ref 0–1.2)
BUN SERPL-MCNC: 16 MG/DL (ref 6–24)
BUN/CREAT SERPL: 14 (ref 9–20)
CALCIUM SERPL-MCNC: 9.1 MG/DL (ref 8.7–10.2)
CHLORIDE SERPL-SCNC: 103 MMOL/L (ref 96–106)
CHOLEST SERPL-MCNC: 164 MG/DL (ref 100–199)
CO2 SERPL-SCNC: 20 MMOL/L (ref 20–29)
CREAT SERPL-MCNC: 1.15 MG/DL (ref 0.76–1.27)
EGFRCR SERPLBLD CKD-EPI 2021: 74 ML/MIN/1.73
ERYTHROCYTE [DISTWIDTH] IN BLOOD BY AUTOMATED COUNT: 13.2 % (ref 11.6–15.4)
GLOBULIN SER CALC-MCNC: 3.1 G/DL (ref 1.5–4.5)
GLUCOSE SERPL-MCNC: 76 MG/DL (ref 70–99)
HCT VFR BLD AUTO: 38.7 % (ref 37.5–51)
HDLC SERPL-MCNC: 48 MG/DL
HGB BLD-MCNC: 12.7 G/DL (ref 13–17.7)
LDLC SERPL CALC-MCNC: 103 MG/DL (ref 0–99)
MCH RBC QN AUTO: 28.7 PG (ref 26.6–33)
MCHC RBC AUTO-ENTMCNC: 32.8 G/DL (ref 31.5–35.7)
MCV RBC AUTO: 88 FL (ref 79–97)
PLATELET # BLD AUTO: 189 X10E3/UL (ref 150–450)
POTASSIUM SERPL-SCNC: 4 MMOL/L (ref 3.5–5.2)
PROT SERPL-MCNC: 7.2 G/DL (ref 6–8.5)
RBC # BLD AUTO: 4.42 X10E6/UL (ref 4.14–5.8)
SODIUM SERPL-SCNC: 140 MMOL/L (ref 134–144)
T4 FREE SERPL-MCNC: 1.12 NG/DL (ref 0.82–1.77)
TRIGL SERPL-MCNC: 68 MG/DL (ref 0–149)
TSH SERPL DL<=0.005 MIU/L-ACNC: 1.07 UIU/ML (ref 0.45–4.5)
VLDLC SERPL CALC-MCNC: 13 MG/DL (ref 5–40)
WBC # BLD AUTO: 3.3 X10E3/UL (ref 3.4–10.8)

## 2025-07-19 LAB
TESTOST FREE SERPL-MCNC: 6 PG/ML (ref 7.2–24)
TESTOST SERPL-MCNC: 633 NG/DL (ref 264–916)